# Patient Record
Sex: FEMALE | Race: BLACK OR AFRICAN AMERICAN | Employment: FULL TIME | URBAN - METROPOLITAN AREA
[De-identification: names, ages, dates, MRNs, and addresses within clinical notes are randomized per-mention and may not be internally consistent; named-entity substitution may affect disease eponyms.]

---

## 2017-02-14 LAB
ANTIBODY SCREEN, EXTERNAL: NEGATIVE
ANTIBODY SCREEN, EXTERNAL: POSITIVE
CHLAMYDIA, EXTERNAL: NEGATIVE
HBSAG, EXTERNAL: NEGATIVE
HIV, EXTERNAL: NEGATIVE
RUBELLA, EXTERNAL: NORMAL
TYPE, ABO & RH, EXTERNAL: NORMAL
TYPE, ABO & RH, EXTERNAL: NORMAL

## 2017-06-27 LAB — T. PALLIDUM, EXTERNAL: NEGATIVE

## 2017-08-25 LAB — GRBS, EXTERNAL: POSITIVE

## 2017-09-06 ENCOUNTER — ANESTHESIA (OUTPATIENT)
Dept: LABOR AND DELIVERY | Age: 32
End: 2017-09-06
Payer: COMMERCIAL

## 2017-09-06 ENCOUNTER — HOSPITAL ENCOUNTER (INPATIENT)
Age: 32
LOS: 2 days | Discharge: HOME | End: 2017-09-08
Attending: OBSTETRICS & GYNECOLOGY | Admitting: OBSTETRICS & GYNECOLOGY
Payer: COMMERCIAL

## 2017-09-06 ENCOUNTER — ANESTHESIA EVENT (OUTPATIENT)
Dept: LABOR AND DELIVERY | Age: 32
End: 2017-09-06
Payer: COMMERCIAL

## 2017-09-06 PROBLEM — Z37.9 NORMAL LABOR: Status: ACTIVE | Noted: 2017-09-06

## 2017-09-06 LAB
ALBUMIN SERPL-MCNC: 2.5 G/DL (ref 3.5–5)
ALBUMIN/GLOB SERPL: 0.6 {RATIO} (ref 1.1–2.2)
ALP SERPL-CCNC: 164 U/L (ref 45–117)
ALT SERPL-CCNC: 16 U/L (ref 12–78)
ANION GAP SERPL CALC-SCNC: 9 MMOL/L (ref 5–15)
AST SERPL-CCNC: 19 U/L (ref 15–37)
BILIRUB SERPL-MCNC: 0.3 MG/DL (ref 0.2–1)
BUN SERPL-MCNC: 4 MG/DL (ref 6–20)
BUN/CREAT SERPL: 8 (ref 12–20)
CALCIUM SERPL-MCNC: 8.5 MG/DL (ref 8.5–10.1)
CHLORIDE SERPL-SCNC: 106 MMOL/L (ref 97–108)
CO2 SERPL-SCNC: 22 MMOL/L (ref 21–32)
CREAT SERPL-MCNC: 0.51 MG/DL (ref 0.55–1.02)
ERYTHROCYTE [DISTWIDTH] IN BLOOD BY AUTOMATED COUNT: 12.9 % (ref 11.5–14.5)
GLOBULIN SER CALC-MCNC: 4 G/DL (ref 2–4)
GLUCOSE SERPL-MCNC: 75 MG/DL (ref 65–100)
HCT VFR BLD AUTO: 33 % (ref 35–47)
HGB BLD-MCNC: 10.8 G/DL (ref 11.5–16)
MCH RBC QN AUTO: 28.1 PG (ref 26–34)
MCHC RBC AUTO-ENTMCNC: 32.7 G/DL (ref 30–36.5)
MCV RBC AUTO: 85.9 FL (ref 80–99)
PLATELET # BLD AUTO: 202 K/UL (ref 150–400)
POTASSIUM SERPL-SCNC: 3.6 MMOL/L (ref 3.5–5.1)
PROT SERPL-MCNC: 6.5 G/DL (ref 6.4–8.2)
RBC # BLD AUTO: 3.84 M/UL (ref 3.8–5.2)
SODIUM SERPL-SCNC: 137 MMOL/L (ref 136–145)
URATE SERPL-MCNC: 3.7 MG/DL (ref 2.6–6)
WBC # BLD AUTO: 6.7 K/UL (ref 3.6–11)

## 2017-09-06 PROCEDURE — 65410000002 HC RM PRIVATE OB

## 2017-09-06 PROCEDURE — 0UQMXZZ REPAIR VULVA, EXTERNAL APPROACH: ICD-10-PCS | Performed by: OBSTETRICS & GYNECOLOGY

## 2017-09-06 PROCEDURE — 74011000250 HC RX REV CODE- 250

## 2017-09-06 PROCEDURE — 80053 COMPREHEN METABOLIC PANEL: CPT | Performed by: OBSTETRICS & GYNECOLOGY

## 2017-09-06 PROCEDURE — 84550 ASSAY OF BLOOD/URIC ACID: CPT | Performed by: OBSTETRICS & GYNECOLOGY

## 2017-09-06 PROCEDURE — 10907ZC DRAINAGE OF AMNIOTIC FLUID, THERAPEUTIC FROM PRODUCTS OF CONCEPTION, VIA NATURAL OR ARTIFICIAL OPENING: ICD-10-PCS | Performed by: OBSTETRICS & GYNECOLOGY

## 2017-09-06 PROCEDURE — 75410000000 HC DELIVERY VAGINAL/SINGLE

## 2017-09-06 PROCEDURE — 85027 COMPLETE CBC AUTOMATED: CPT | Performed by: OBSTETRICS & GYNECOLOGY

## 2017-09-06 PROCEDURE — 36415 COLL VENOUS BLD VENIPUNCTURE: CPT | Performed by: OBSTETRICS & GYNECOLOGY

## 2017-09-06 PROCEDURE — 00HU33Z INSERTION OF INFUSION DEVICE INTO SPINAL CANAL, PERCUTANEOUS APPROACH: ICD-10-PCS | Performed by: ANESTHESIOLOGY

## 2017-09-06 PROCEDURE — 77030011943

## 2017-09-06 PROCEDURE — 0HQ9XZZ REPAIR PERINEUM SKIN, EXTERNAL APPROACH: ICD-10-PCS | Performed by: OBSTETRICS & GYNECOLOGY

## 2017-09-06 PROCEDURE — 75410000003 HC RECOV DEL/VAG/CSECN EA 0.5 HR

## 2017-09-06 PROCEDURE — 76060000078 HC EPIDURAL ANESTHESIA

## 2017-09-06 PROCEDURE — 77030007880 HC KT SPN EPDRL BBMI -B

## 2017-09-06 PROCEDURE — 74011250636 HC RX REV CODE- 250/636: Performed by: OBSTETRICS & GYNECOLOGY

## 2017-09-06 PROCEDURE — 74011250636 HC RX REV CODE- 250/636

## 2017-09-06 PROCEDURE — 74011250637 HC RX REV CODE- 250/637: Performed by: OBSTETRICS & GYNECOLOGY

## 2017-09-06 PROCEDURE — 75410000002 HC LABOR FEE PER 1 HR

## 2017-09-06 RX ORDER — OXYCODONE AND ACETAMINOPHEN 5; 325 MG/1; MG/1
2 TABLET ORAL
Status: DISCONTINUED | OUTPATIENT
Start: 2017-09-06 | End: 2017-09-08 | Stop reason: HOSPADM

## 2017-09-06 RX ORDER — DIPHENHYDRAMINE HCL 25 MG
25 CAPSULE ORAL
Status: DISCONTINUED | OUTPATIENT
Start: 2017-09-06 | End: 2017-09-08 | Stop reason: HOSPADM

## 2017-09-06 RX ORDER — BUPIVACAINE HYDROCHLORIDE 5 MG/ML
30 INJECTION, SOLUTION EPIDURAL; INTRACAUDAL ONCE
Status: ACTIVE | OUTPATIENT
Start: 2017-09-06 | End: 2017-09-06

## 2017-09-06 RX ORDER — ONDANSETRON 4 MG/1
4 TABLET, ORALLY DISINTEGRATING ORAL
Status: DISCONTINUED | OUTPATIENT
Start: 2017-09-06 | End: 2017-09-08 | Stop reason: HOSPADM

## 2017-09-06 RX ORDER — OXYTOCIN IN 5 % DEXTROSE 30/500 ML
PLASTIC BAG, INJECTION (ML) INTRAVENOUS
Status: DISPENSED
Start: 2017-09-06 | End: 2017-09-06

## 2017-09-06 RX ORDER — SODIUM CHLORIDE 0.9 % (FLUSH) 0.9 %
5-10 SYRINGE (ML) INJECTION EVERY 8 HOURS
Status: DISCONTINUED | OUTPATIENT
Start: 2017-09-06 | End: 2017-09-06 | Stop reason: HOSPADM

## 2017-09-06 RX ORDER — ACETAMINOPHEN 325 MG/1
650 TABLET ORAL
Status: DISCONTINUED | OUTPATIENT
Start: 2017-09-06 | End: 2017-09-08 | Stop reason: HOSPADM

## 2017-09-06 RX ORDER — NALOXONE HYDROCHLORIDE 0.4 MG/ML
0.4 INJECTION, SOLUTION INTRAMUSCULAR; INTRAVENOUS; SUBCUTANEOUS AS NEEDED
Status: DISCONTINUED | OUTPATIENT
Start: 2017-09-06 | End: 2017-09-06 | Stop reason: HOSPADM

## 2017-09-06 RX ORDER — SIMETHICONE 80 MG
80 TABLET,CHEWABLE ORAL
Status: DISCONTINUED | OUTPATIENT
Start: 2017-09-06 | End: 2017-09-08 | Stop reason: HOSPADM

## 2017-09-06 RX ORDER — BUPIVACAINE HYDROCHLORIDE 5 MG/ML
30 INJECTION, SOLUTION EPIDURAL; INTRACAUDAL AS NEEDED
Status: DISCONTINUED | OUTPATIENT
Start: 2017-09-06 | End: 2017-09-06 | Stop reason: HOSPADM

## 2017-09-06 RX ORDER — SODIUM CHLORIDE 0.9 % (FLUSH) 0.9 %
5-10 SYRINGE (ML) INJECTION AS NEEDED
Status: DISCONTINUED | OUTPATIENT
Start: 2017-09-06 | End: 2017-09-08 | Stop reason: HOSPADM

## 2017-09-06 RX ORDER — NALBUPHINE HYDROCHLORIDE 10 MG/ML
10 INJECTION, SOLUTION INTRAMUSCULAR; INTRAVENOUS; SUBCUTANEOUS
Status: DISCONTINUED | OUTPATIENT
Start: 2017-09-06 | End: 2017-09-06 | Stop reason: HOSPADM

## 2017-09-06 RX ORDER — SODIUM CHLORIDE, SODIUM LACTATE, POTASSIUM CHLORIDE, CALCIUM CHLORIDE 600; 310; 30; 20 MG/100ML; MG/100ML; MG/100ML; MG/100ML
125 INJECTION, SOLUTION INTRAVENOUS CONTINUOUS
Status: DISCONTINUED | OUTPATIENT
Start: 2017-09-06 | End: 2017-09-07

## 2017-09-06 RX ORDER — FENTANYL CITRATE 50 UG/ML
INJECTION, SOLUTION INTRAMUSCULAR; INTRAVENOUS
Status: DISPENSED
Start: 2017-09-06 | End: 2017-09-06

## 2017-09-06 RX ORDER — DOCUSATE SODIUM 100 MG/1
100 CAPSULE, LIQUID FILLED ORAL
Status: DISCONTINUED | OUTPATIENT
Start: 2017-09-06 | End: 2017-09-08 | Stop reason: HOSPADM

## 2017-09-06 RX ORDER — IBUPROFEN 400 MG/1
800 TABLET ORAL EVERY 8 HOURS
Status: DISCONTINUED | OUTPATIENT
Start: 2017-09-06 | End: 2017-09-08 | Stop reason: HOSPADM

## 2017-09-06 RX ORDER — FENTANYL/BUPIVACAINE/NS/PF 2-1250MCG
PREFILLED PUMP RESERVOIR EPIDURAL
Status: COMPLETED
Start: 2017-09-06 | End: 2017-09-06

## 2017-09-06 RX ORDER — FENTANYL CITRATE 50 UG/ML
100 INJECTION, SOLUTION INTRAMUSCULAR; INTRAVENOUS
Status: DISCONTINUED | OUTPATIENT
Start: 2017-09-06 | End: 2017-09-06 | Stop reason: HOSPADM

## 2017-09-06 RX ORDER — TERBUTALINE SULFATE 1 MG/ML
0.25 INJECTION SUBCUTANEOUS AS NEEDED
Status: DISCONTINUED | OUTPATIENT
Start: 2017-09-06 | End: 2017-09-06 | Stop reason: HOSPADM

## 2017-09-06 RX ORDER — FENTANYL/BUPIVACAINE/NS/PF 2-1250MCG
1-16 PREFILLED PUMP RESERVOIR EPIDURAL CONTINUOUS
Status: DISCONTINUED | OUTPATIENT
Start: 2017-09-06 | End: 2017-09-07

## 2017-09-06 RX ORDER — HYDROCORTISONE 1 %
CREAM (GRAM) TOPICAL AS NEEDED
Status: DISCONTINUED | OUTPATIENT
Start: 2017-09-06 | End: 2017-09-08 | Stop reason: HOSPADM

## 2017-09-06 RX ORDER — FENTANYL CITRATE 50 UG/ML
INJECTION, SOLUTION INTRAMUSCULAR; INTRAVENOUS AS NEEDED
Status: DISCONTINUED | OUTPATIENT
Start: 2017-09-06 | End: 2017-09-06 | Stop reason: HOSPADM

## 2017-09-06 RX ORDER — BUPIVACAINE HYDROCHLORIDE 5 MG/ML
INJECTION, SOLUTION EPIDURAL; INTRACAUDAL
Status: DISPENSED
Start: 2017-09-06 | End: 2017-09-06

## 2017-09-06 RX ORDER — OXYTOCIN/RINGER'S LACTATE 20/1000 ML
125-1000 PLASTIC BAG, INJECTION (ML) INTRAVENOUS AS NEEDED
Status: DISCONTINUED | OUTPATIENT
Start: 2017-09-06 | End: 2017-09-08 | Stop reason: HOSPADM

## 2017-09-06 RX ORDER — OXYCODONE AND ACETAMINOPHEN 5; 325 MG/1; MG/1
1 TABLET ORAL
Status: DISCONTINUED | OUTPATIENT
Start: 2017-09-06 | End: 2017-09-08 | Stop reason: HOSPADM

## 2017-09-06 RX ORDER — LIDOCAINE HYDROCHLORIDE AND EPINEPHRINE 15; 5 MG/ML; UG/ML
INJECTION, SOLUTION EPIDURAL AS NEEDED
Status: DISCONTINUED | OUTPATIENT
Start: 2017-09-06 | End: 2017-09-06 | Stop reason: HOSPADM

## 2017-09-06 RX ORDER — FENTANYL CITRATE 50 UG/ML
100 INJECTION, SOLUTION INTRAMUSCULAR; INTRAVENOUS ONCE
Status: DISCONTINUED | OUTPATIENT
Start: 2017-09-06 | End: 2017-09-06 | Stop reason: HOSPADM

## 2017-09-06 RX ORDER — BUPIVACAINE HYDROCHLORIDE 2.5 MG/ML
INJECTION, SOLUTION EPIDURAL; INFILTRATION; INTRACAUDAL AS NEEDED
Status: DISCONTINUED | OUTPATIENT
Start: 2017-09-06 | End: 2017-09-06 | Stop reason: HOSPADM

## 2017-09-06 RX ORDER — AMMONIA 15 % (W/V)
1 AMPUL (EA) INHALATION AS NEEDED
Status: DISCONTINUED | OUTPATIENT
Start: 2017-09-06 | End: 2017-09-08 | Stop reason: HOSPADM

## 2017-09-06 RX ORDER — SODIUM CHLORIDE 0.9 % (FLUSH) 0.9 %
5-10 SYRINGE (ML) INJECTION EVERY 8 HOURS
Status: DISCONTINUED | OUTPATIENT
Start: 2017-09-06 | End: 2017-09-08 | Stop reason: HOSPADM

## 2017-09-06 RX ORDER — HYDROCORTISONE ACETATE PRAMOXINE HCL 2.5; 1 G/100G; G/100G
CREAM TOPICAL AS NEEDED
Status: DISCONTINUED | OUTPATIENT
Start: 2017-09-06 | End: 2017-09-08 | Stop reason: HOSPADM

## 2017-09-06 RX ORDER — SODIUM CHLORIDE 0.9 % (FLUSH) 0.9 %
5-10 SYRINGE (ML) INJECTION AS NEEDED
Status: DISCONTINUED | OUTPATIENT
Start: 2017-09-06 | End: 2017-09-06 | Stop reason: HOSPADM

## 2017-09-06 RX ORDER — CLINDAMYCIN PHOSPHATE 900 MG/50ML
900 INJECTION INTRAVENOUS EVERY 8 HOURS
Status: DISCONTINUED | OUTPATIENT
Start: 2017-09-06 | End: 2017-09-06 | Stop reason: HOSPADM

## 2017-09-06 RX ADMIN — Medication 10 ML: at 09:40

## 2017-09-06 RX ADMIN — LIDOCAINE HYDROCHLORIDE AND EPINEPHRINE 3 ML: 15; 5 INJECTION, SOLUTION EPIDURAL at 11:05

## 2017-09-06 RX ADMIN — IBUPROFEN 800 MG: 400 TABLET, FILM COATED ORAL at 18:23

## 2017-09-06 RX ADMIN — Medication 10 ML/HR: at 11:13

## 2017-09-06 RX ADMIN — FENTANYL CITRATE 100 MCG: 50 INJECTION, SOLUTION INTRAMUSCULAR; INTRAVENOUS at 11:03

## 2017-09-06 RX ADMIN — FENTANYL 0.2 MG/100ML-BUPIV 0.125%-NACL 0.9% EPIDURAL INJ 10 ML/HR: 2/0.125 SOLUTION at 11:13

## 2017-09-06 RX ADMIN — BUPIVACAINE HYDROCHLORIDE 3 ML: 2.5 INJECTION, SOLUTION EPIDURAL; INFILTRATION; INTRACAUDAL at 11:06

## 2017-09-06 RX ADMIN — LIDOCAINE HYDROCHLORIDE AND EPINEPHRINE 2 ML: 15; 5 INJECTION, SOLUTION EPIDURAL at 11:03

## 2017-09-06 RX ADMIN — BUPIVACAINE HYDROCHLORIDE 2 ML: 2.5 INJECTION, SOLUTION EPIDURAL; INFILTRATION; INTRACAUDAL at 11:07

## 2017-09-06 RX ADMIN — CLINDAMYCIN PHOSPHATE 900 MG: 18 INJECTION, SOLUTION INTRAMUSCULAR; INTRAVENOUS at 09:05

## 2017-09-06 RX ADMIN — SODIUM CHLORIDE, SODIUM LACTATE, POTASSIUM CHLORIDE, AND CALCIUM CHLORIDE 125 ML/HR: 600; 310; 30; 20 INJECTION, SOLUTION INTRAVENOUS at 08:50

## 2017-09-06 NOTE — PROGRESS NOTES
0745 Bedside report received from Kayden Walsh RN. Pt in bed, breathing well with UC's.   0937 Pt off monitor to ambulate in hallway, instructions given to return if SROM, contractions intensify. 1030 Dr Ruben Thompson at bedside, SVE 6-7/80/-1. Plan for epidural.   1057 Dr Felipe Curiel at bedside to place epidural.   [address] REPORT:    Verbal report given to Isrrael Mejia RN on [facility]  being transferred to MIU(unit) for routine progression of care       Report consisted of patients Situation, Background, Assessment and   Recommendations(SBAR). Information from the following report(s) SBAR, Intake/Output and MAR was reviewed with the receiving nurse. Lines:   Peripheral IV 09/06/17 Left Forearm (Active)   Site Assessment Clean, dry, & intact 9/6/2017  9:06 AM   Phlebitis Assessment 0 9/6/2017  9:06 AM   Infiltration Assessment 0 9/6/2017  9:06 AM   Dressing Status Clean, dry, & intact 9/6/2017  9:06 AM   Dressing Type Tape;Transparent 9/6/2017  9:06 AM   Hub Color/Line Status Pink; Infusing 9/6/2017  9:06 AM        Opportunity for questions and clarification was provided.       Patient transported with:   Registered Nurse

## 2017-09-06 NOTE — ROUTINE PROCESS
TRANSFER - IN REPORT:    Verbal report received from Emily Ospina RN(name) on Luis Salinas  being received from L&D(unit) for routine progression of care      Report consisted of patients Situation, Background, Assessment and   Recommendations(SBAR). Information from the following report(s) SBAR was reviewed with the receiving nurse. Opportunity for questions and clarification was provided. Assessment completed upon patients arrival to unit and care assumed.

## 2017-09-06 NOTE — H&P
EDC:2017  EGA: 37 weeks, 4 days      Primary Provider:  Lyndall Brunner MD    CC:  Labor. History of Present Illness:  Pt presents with regular CTX q3-7 mins that are painful. Pt reports +FM, no VB, LOF. Reports pregnancy has been uncomplicated. She is GBS+. Patient's Prenatal Care with Doctor of Record Lyndall Brunner MD Notable For -    GBS positive RX in labor  Obesity in pregnancy BMI>34.99-FS ____  Size greater than dates;;efw 83rd%  *Note: varicella non immune  Headache pregnant  Normal pregnancy multigravida  Obesity in pregnancy BMI>34.99-FS _(hgbA1C 5.7%, 2017), baby ASA, mo growth u/s_          Impression & Recommendations:    Problem # 1:  Labor term active (ICD-650) (DKF51-M78)  Admit  EFM/[place]  Clear liquids. AROM PRN  Epidural PRN. Anticipate . Problem # 2:  Gestational hypertension (ICD-642.33) (COV16-C33.3)  Mild range BPs on admission  Will get pre-eclampsia labs. No indication for Mg at this time. Problem # 3:  GBS positive RX in labor (LIY-368.00) (WPI10-L45.82)  Clinda for GBS ppx. Will AROM if adequate coverage. Problem # 4:  Size greater than dates;;efw 83rd% (JCD-636.43) (OUS66-L10.62x0)    Problem # 5:  *Note: varicella non immune (ICD-V72.31) (XBY95-Y82.419)      Past Pregnancy History      :  2     Term Births:  1     Premature Births: 0     Living Children: 1     Para:   1     Mult. Births:  0     Prev : 0     Prev.  attempt? 0     Aborta:  0     Elect. Ab:  0     Spont.  Ab:  0     Ectopics:  0    Pregnancy # 1     Delivery date:   10/21/2009     Weeks Gestation: 38+      labor:   no     Delivery type:        Hours of labor:   7     Anesthesia type:   epidural     Delivery location:   [facility]     Infant Sex:  Male     Birth weight:  5lb 13oz     Name:  Uli     Comments:  Del by Dr. Francisco Javier White at term, labile BPs,     Pregnancy # 2     Comments:  present        Past Medical History:     Reviewed history from 2016 and no changes required: Allergies        Headaches (Migraines) and Sinus headaches        GBS POS        PIH near term with G1        IUD Mirena insertion 3/10        Ovarian Cysts    Past Surgical History:     Reviewed history from 2010 and no changes required:        L Ankle-surgery         [place] [facility]-age 21         m [id number] [address]    Family History Summary:      Reviewed history Last on 2017 and no changes required:2017      General Comments - FH:  Family history transferred to [place] [address] History:     Reviewed history from 2017 and no changes required:        , stable relationship        Uli 10/09      Previous Tobacco Use: Signed On - 2017  Smoked Tobacco Use:  Never smoker  Smokeless Tobacco Use:  Never     Counseled to quit/cut down:  yes  Passive smoke exposure:  yes  Drug use:  no  HIV high-risk behavior:  No  Caffeine use:  <1 drinks per day    Previous Alcohol Use: Signed On - 2017  Alcohol use:  yes     Type:  occas  Exercise:  no  Seatbelt use:  100 %  Sun Exposure:  Ocassionaly    Mammogram History:     Date of Last Mammogram:  2014    PAP Smear History:     Date of Last PAP Smear:  2016      Review of Systems        See HPI    Except as noted in the HPI, the review of systems is negative for General, Breast, , Resp, GI, Endo, MS, Psych and Heme.     Allergies    PCN (Moderate)  ERYTHROMYCIN (Moderate)      Medications Removed from Medication List        Flowsheet View for Follow-up Visit     Estimated weeks of        gestation:  37 4/7     Fetal position:  vertex     Cx Dilation:  3cm     Cx Effacement: 90%     Cx Station:  -2          Physical Exam     General           General appearance:  no acute distress    Head           Inspection:   normal    Eyes           External:   EOM intact    ENT           Dental:   adequate dentition    Chest           Lungs:  clear to auscultation Heart:  regular rate and rhythm    Extremeties           Extremeties:  0 edema    Neurological           Reflexes:  2+ and symmetric with no pathological reflexes    Psych           Orientation:  oriented to time, place, and person          Mood:  no appearance of anxiety, depression, or agitation    Lymph           Inguinal:  no inguinal adenopathy    Skin           Inspection:  no rashes, suspicious lesions, or ulcerations    Abdomen           Abdomen:  gravid    Pelvic Exam           EGBUS:  no lesions          Vagina:  normal appearing without lesions or discharge          Uterus:  gravid          Cervix:  no lesions or discharge                  Dilation: : 3cm                  Effacement:  90%                  Station:  -2                  Presentation:  vertex                  Membranes:  intact            Impression & Recommendations:    Problem # 1:  Labor term active (ICD-650) (HHY25-U87)  Admit  EFM/[place]  Clear liquids. AROM PRN  Epidural PRN. Anticipate . Problem # 2:  Gestational hypertension (ICD-642.33) (OIU47-Q24.3)  Mild range BPs on admission  Will get pre-eclampsia labs. No indication for Mg at this time. Problem # 3:  GBS positive RX in labor (ZY-902.87) (OKF88-W20.82)  Clinda for GBS ppx. Will AROM if adequate coverage.       Problem # 4:  Size greater than dates;;efw 83rd% ([facility]-092.13) (PKA61-L05.62x0)    Problem # 5:  *Note: varicella non immune (ICD-V72.31) (RAA36-Q77.419)      Medications (at conclusion of this visit)    2017 * IRON   2017 TYLENOL TABS (ACETAMINOPHEN TABS)   2017 * PNV           LABORATORY DATA   TEST DATE RESULT   Group B Strep culture 2017 Positive                                   (Group B Strep Culture Result Field)   Blood Type 2017 O                                             (Blood Type Result Field)   Rh 2017 Positive                                   (Rh Result Field)   Rhogam Inj Given 10/21/2009 *   Tdap Vaccine Given 06/27/2017 Vacc. [address]   Antibody Screen 06/27/2017 Negative   Rubella  [facility] Reference Ranges On or After 3/10/14                  <0.90              Non-immune      0.90 - 0.99     Equivocal      >0.99              Immune    [facility] Reference Ranges  Before 3/10/14           <5                 Non-immune             5 - 9               Equivocal            >9                 Immune  [facility] Reference Ranges       < Or = 0.90       Negative             0.91-1.09          Equivocal            > Or = 1.10       Positive   02/14/2017     5.17     TPA (T Pallidum Antibodies) 06/27/2017 Negative   Serology (RPR) 10/21/2009 *   HBsAg 02/14/2017 Negative   HIV 02/14/2017 Non Reactive   Hemoglobin 06/27/2017 11.1   Hematocrit 06/27/2017 33.5   Platelets 28/85/5771 279 X10E3/UL   TSH 02/14/2017 1.190   Urine Culture 02/17/2017 Negative   GC DNA Probe 02/14/2017 Negative   Chlamydia DNA 02/14/2017 Negative   PAP 12/09/2016 NIL   Flu Vaccine Given 02/14/2017 vacc. elsewhere   HGBA1C 02/14/2017 5.7   HGB Electro 02/21/2017 AA   T4, Free 10/21/2009 *   BG Fasting 10/21/2009 *   GTT 1H 50G 06/27/2017 90   GTT 1H 100G 10/21/2009 *   GTT 2H 100G 10/21/2009 *   GTT 3H 100G 10/21/2009 *   Glucose Plasma 10/21/2009 *   CF Accept or Decline 02/14/2017 declined   CF Screen Result     Nuchal Trans 05/17/2017 3.25^3. 25 mm&millimeters   AFP Only 04/11/2017 *Screen Negative*   Tetra 05/21/2009 AFPNEG   AFP Serum 10/21/2009 *   CVS     AFP Amniotic 10/21/2009 *   Amnio Karyo 10/21/2009 *   FISH 10/21/2009 *   GC Culture 10/21/2009 *   Chlamydia Cult 10/21/2009 *   Ureaplasma     Mycoplasma     WBC 04/11/2017 8.3 X10E3/UL   RBC 04/11/2017 3.91 X10E6/UL   MCV 04/11/2017 90   MCH 04/11/2017 30.2   MCHC RBC 04/11/2017 33.7     ULTRASOUND DATA   TEST DATE RESULT   Estimated Fetal Weight 08/10/2017 4684.05440331^2199 g&grams                                     Weight % 08/10/2017 79^79% %&percent AMOL 08/10/2017 13.45^13. 5 cm&centimeters                    BPP 08/10/2017 8^8 [n/a]&Not applicable   Cervical Length (mm) 12/19/2014 35.000           Electronically signed by Kaiden Birch MD on 09/06/2017 at 8:12 AM    ________________________________________________________________________

## 2017-09-06 NOTE — ANESTHESIA PREPROCEDURE EVALUATION
Anesthetic History   No history of anesthetic complications            Review of Systems / Medical History  Patient summary reviewed, nursing notes reviewed and pertinent labs reviewed    Pulmonary  Within defined limits                 Neuro/Psych   Within defined limits           Cardiovascular  Within defined limits                     GI/Hepatic/Renal  Within defined limits              Endo/Other        Morbid obesity     Other Findings              Physical Exam    Airway  Mallampati: II  TM Distance: > 6 cm  Neck ROM: normal range of motion   Mouth opening: Normal     Cardiovascular  Regular rate and rhythm,  S1 and S2 normal,  no murmur, click, rub, or gallop             Dental  No notable dental hx       Pulmonary  Breath sounds clear to auscultation               Abdominal  GI exam deferred       Other Findings            Anesthetic Plan    ASA: 3  Anesthesia type: epidural          Induction: Intravenous  Anesthetic plan and risks discussed with: Patient

## 2017-09-06 NOTE — IP AVS SNAPSHOT
[address] 
[phone] Patient: Aguilar Mayberry MRN: [id number] :1985 You are allergic to the following Allergen Reactions Erythromycin Nausea and Vomiting Pcn (Penicillins) Hives Recent Documentation Height Weight Breastfeeding? BMI OB Status Smoking Status 1.651 m 109.8 kg Unknown 40.27 kg/m2 Recent pregnancy Never Smoker Unresulted Labs Order Current Status SAMPLE TO BLOOD BANK In process Emergency Contacts Name Discharge Info Relation Home Work Mobile Jovani Del Toro DISCHARGE CAREGIVER [3] Spouse [3] [phone] Claudeen Marvel  Parent [1] [phone] About your hospitalization You were admitted on:  2017 You last received care in the:  [address] [facility] You were discharged on:  2017 Unit phone number:  [phone] Why you were hospitalized Your primary diagnosis was:  Not on File Your diagnoses also included:  Normal Labor Providers Seen During Your Hospitalizations Provider Role Specialty Primary office phone Neo Mena MD Attending Provider Obstetrics & Gynecology [phone] Your Primary Care Physician (PCP) Primary Care Physician Office Phone Office Fax [address] [address] [phone] Follow-up Information Follow up With Details Comments Contact Info [facility] Call As needed with breastfeeding questions [address] [place] [address], [address] 
[phone] Kiel Tavares MD   [address] [place] [address] [address] [facility] [place] [address] 
[phone] Neo Mena MD Schedule an appointment as soon as possible for a visit in 6 weeks  [address]  
[address] [place] [facility] 
[phone] Current Discharge Medication List  
  
 START taking these medications Dose & Instructions Dispensing Information Comments Morning Noon Evening Bedtime  
 ibuprofen 800 mg tablet Commonly known as:  MOTRIN Your last dose was: Your next dose is:    
   
   
 Dose:  800 mg Take 1 Tab by mouth every eight (8) hours as needed for Pain. Quantity:  30 Tab Refills:  0 CONTINUE these medications which have NOT CHANGED Dose & Instructions Dispensing Information Comments Morning Noon Evening Bedtime PNV66-Iron Fumarate-FA-DSS-DHA 26-1.2- mg Cap Your last dose was: Your next dose is: Take  by mouth. Indications: Pregnancy Refills:  0 STOP taking these medications FLONASE 50 mcg/actuation nasal spray Generic drug:  fluticasone Intrauterine Device (IUD) Iud Where to Get Your Medications Information on where to get these meds will be given to you by the nurse or doctor. ! Ask your nurse or doctor about these medications  
  ibuprofen 800 mg tablet Discharge Instructions After Your Delivery (the Postpartum Period): Care Instructions Your Care Instructions Congratulations on the birth of your baby. Like pregnancy, the  period can be a time of excitement, ashkan, and exhaustion. You may look at your wondrous little baby and feel happy. You may also be overwhelmed by your new sleep hours and new responsibilities. At first, babies often sleep during the days and are awake at night. They do not have a pattern or routine. They may make sudden gasps, jerk themselves awake, or look like they have crossed eyes. These are all normal, and they may even make you smile. In these first weeks after delivery, try to take good care of yourself.  It may take 4 to 6 weeks to feel like yourself again, and possibly longer if you had a  birth. You will likely feel very tired for several weeks. Your days will be full of ups and downs, but lots of ashkan as well. Follow-up care is a key part of your treatment and safety. Be sure to make and go to all appointments, and call your doctor if you are having problems. It's also a good idea to know your test results and keep a list of the medicines you take. How can you care for yourself at home? Take care of your body after delivery · Use pads instead of tampons for the bloody flow that may last as long as 2 weeks. · Ease cramps with ibuprofen (Advil, Motrin). · Ease soreness of hemorrhoids and the area between your vagina and rectum with ice compresses or witch hazel pads. · Ease constipation by drinking lots of fluid and eating high-fiber foods. Ask your doctor about over-the-counter stool softeners. · Cleanse yourself with a gentle squeeze of warm water from a bottle instead of wiping with toilet paper. · Take a sitz bath in warm water several times a day. · Wear a good nursing bra. Ease sore and swollen breasts with warm, wet washcloths. · If you are not breastfeeding, use ice rather than heat for breast soreness. · Your period may not start for several months if you are breastfeeding. You may bleed more, and longer at first, than you did before you got pregnant. · Wait until you are healed (about 4 to 6 weeks) before you have sexual intercourse. Your doctor will tell you when it is okay to have sex. · Try not to travel with your baby for 5 or 6 weeks. If you take a long car trip, make frequent stops to walk around and stretch. Avoid exhaustion · Rest every day. Try to nap when your baby naps. · Ask another adult to be with you for a few days after delivery. · Plan for  if you have other children. · Stay flexible so you can eat at odd hours and sleep when you need to. Both you and your baby are making new schedules. · Plan small trips to get out of the house. Change can make you feel less tired. · Ask for help with housework, cooking, and shopping. Remind yourself that your job is to care for your baby. Know about help for postpartum depression · \"Baby blues\" are common for the first 1 to 2 weeks after birth. You may cry or feel sad or irritable for no reason. · Rest whenever you can. Being tired makes it harder to handle your emotions. · Go for walks with your baby. · Talk to your partner, friends, and family about your feelings. · If your symptoms last for more than a few weeks, or if you feel very depressed, ask your doctor for help. · Postpartum depression can be treated. Support groups and counseling can help. Sometimes medicine can also help. Stay healthy · Eat healthy foods so you have more energy, make good breast milk, and lose extra baby pounds. · If you breastfeed, avoid alcohol and drugs. Stay smoke-free. If you quit during pregnancy, congratulations. · Start daily exercise after 4 to 6 weeks, but rest when you feel tired. · Learn exercises to tone your belly. Do Kegel exercises to regain strength in your pelvic muscles. You can do these exercises while you stand or sit. ¨ Squeeze the same muscles you would use to stop your urine. Your belly and thighs should not move. ¨ Hold the squeeze for 3 seconds, and then relax for 3 seconds. ¨ Start with 3 seconds. Then add 1 second each week until you are able to squeeze for 10 seconds. ¨ Repeat the exercise 10 to 15 times for each session. Do three or more sessions each day. · Find a class for new mothers and new babies that has an exercise time. · If you had a  birth, give yourself a bit more time before you exercise, and be careful. When should you call for help? Call 911 anytime you think you may need emergency care. For example, call if: 
· You passed out (lost consciousness). Call your doctor now or seek immediate medical care if: · You have severe vaginal bleeding. This means you are passing blood clots and soaking through a pad each hour for 2 or more hours. · You are dizzy or lightheaded, or you feel like you may faint. · You have a fever. · You have new belly pain, or your pain gets worse. Watch closely for changes in your health, and be sure to contact your doctor if: 
· Your vaginal bleeding seems to be getting heavier. · You have new or worse vaginal discharge. · You feel sad, anxious, or hopeless for more than a few days. · You do not get better as expected. Where can you learn more? Go to http://javi-melvin.info/. Enter A461 in the search box to learn more about \"After Your Delivery (the Postpartum Period): Care Instructions. \" Current as of: March 16, 2017 Content Version: 11.3 © 3622-3154 [employer]. Care instructions adapted under license by [employer] (which disclaims liability or warranty for this information). If you have questions about a medical condition or this instruction, always ask your healthcare professional. [address] any warranty or liability for your use of this information. Discharge Orders None Lively Announcement We are excited to announce that we are making your provider's discharge notes available to you in Lively. You will see these notes when they are completed and signed by the physician that discharged you from your recent hospital stay. If you have any questions or concerns about any information you see in Lively, please call the Health Information Department where you were seen or reach out to your Primary Care Provider for more information about your plan of care. Introducing [facility]! Franko Daly introduces Lively patient portal. Now you can access parts of your medical record, email your doctor's office, and request medication refills online.    
 
1. In your internet browser, go to [email or web address] 2. Click on the First Time User? Click Here link in the Sign In box. You will see the New Member Sign Up page. 3. Enter your Stickybits Access Code exactly as it appears below. You will not need to use this code after youve completed the sign-up process. If you do not sign up before the expiration date, you must request a new code. · Stickybits Access Code: [facility] Expires: 12/7/2017  1:48 PM 
 
4. Enter the last four digits of your Social Security Number (xxxx) and Date of Birth (mm/dd/yyyy) as indicated and click Submit. You will be taken to the next sign-up page. 5. Create a Stickybits ID. This will be your Stickybits login ID and cannot be changed, so think of one that is secure and easy to remember. 6. Create a Stickybits password. You can change your password at any time. 7. Enter your Password Reset Question and Answer. This can be used at a later time if you forget your password. 8. Enter your e-mail address. You will receive e-mail notification when new information is available in [address] 9. Click Sign Up. You can now view and download portions of your medical record. 10. Click the Download Summary menu link to download a portable copy of your medical information. If you have questions, please visit the Frequently Asked Questions section of the Stickybits website. Remember, Stickybits is NOT to be used for urgent needs. For medical emergencies, dial 911. Now available from your iPhone and Android! General Information Please provide this summary of care documentation to your next provider. Patient Signature:  ____________________________________________________________ Date:  ____________________________________________________________  
  
[facility] Provider Signature:  ____________________________________________________________ Date:  ____________________________________________________________

## 2017-09-06 NOTE — IP AVS SNAPSHOT
[address] 
[phone] Patient: Mik Kurtz MRN: [id number] :1985 Current Discharge Medication List  
  
START taking these medications Dose & Instructions Dispensing Information Comments Morning Noon Evening Bedtime  
 ibuprofen 800 mg tablet Commonly known as:  MOTRIN Your last dose was: Your next dose is:    
   
   
 Dose:  800 mg Take 1 Tab by mouth every eight (8) hours as needed for Pain. Quantity:  30 Tab Refills:  0 CONTINUE these medications which have NOT CHANGED Dose & Instructions Dispensing Information Comments Morning Noon Evening Bedtime PNV66-Iron Fumarate-FA-DSS-DHA 26-1.2- mg Cap Your last dose was: Your next dose is: Take  by mouth. Indications: Pregnancy Refills:  0 STOP taking these medications FLONASE 50 mcg/actuation nasal spray Generic drug:  fluticasone Intrauterine Device (IUD) Iud Where to Get Your Medications Information on where to get these meds will be given to you by the nurse or doctor. ! Ask your nurse or doctor about these medications  
  ibuprofen 800 mg tablet

## 2017-09-06 NOTE — PROGRESS NOTES
6702: Pt arrived to LDR 8 c/o contractions every 3-7 minutes. Pt states +FM, denies vaginal bleeding and leaking of fluid.

## 2017-09-06 NOTE — PROGRESS NOTES
Delivery Summary  Patient: Lis Che             Circumcision:   desires  Additional Delivery Comments - , GHTN no meds noted while in labor, nuchal cord x1, GBS+ clinda.     Information for the patient's :  Joan Lacey [[id number]]     Delivery Type: Vaginal, Spontaneous Delivery   Delivery Date: 2017   Delivery Time: 12:35 PM     Birth Weight:       Sex:  male  Delivery Clinician:  Shahana Hair   Gestational Age: Gestational Age: 37w1d    Presentation: Vertex   Position:             Apgars were   and       Resuscitation Method:       Meconium Stained:    Living Status:         Placenta Date/Time:     Placenta Removal:     Placenta Appearance: Vertex [1]     Cord Information:      Cord Events:         Cord Blood Sent?:       Blood Gases Sent?:          Cord pH:  none    Episiotomy:   None  Laceration(s):     L periurethral, 1st degree perineal, hemostatic  Estimated Blood Loss (ml): 300mL    Labor Events  Method: None      Augmentation: None   Cervical Ripening:       None        Operative Vaginal Delivery - none

## 2017-09-06 NOTE — LACTATION NOTE
This note was copied from a baby's chart. Initial Lactation Consultation: Infant born this afternoon vaginally to a  mom at 40 2/7 weeks gestation. Mom has an 6year old that she tried to nurse but had limited success due to latch issues. Infant observed at breast; Baby nursing well today,  deep latch obtained, mother is comfortable, baby feeding vigorously with rhythmic suck, swallow, breathe pattern, occasional audible swallowing, and evident milk transfer, both breasts offered, baby is asleep following feeding.  behaviors reviewed, Very sleepy in first 24 hours, mother must wake baby for feedings, offer hand expressed drops, baby usually will respond and feed vigorously 6-8 times in the first day, but is important to offer 8-12 times,  After baby wakes from deep sleep usually on the 2nd or 3rd day a new behavior pattern follows. Frequent feeding during this brief behavioral phase preceeding milk transition is called cluster feeding. Typical  behavior: baby becomes vigorous at the breast and wants to feed frequently- every 1-2 hours for several feedings. This is the normal process by which the baby demands his/her supply. This type of frequent feeding is the stimulation which causes lactogenesis II (milk coming in). Skin to skin and rooming in  discussed with mom. The benefits include infants temperature regulation, decrease stress in mom and baby, increase in maternal milk production and allowing mom to see early feeding cues. Feeding Plan: Mother will keep baby skin to skin as often as possible, feed on demand, 8-12x/day , respond to feeding cues, obtain latch, listen for audible swallowing, be aware of signs of oxytocin release/ cramping,thirst,sleepiness while breastfeeding, offer both breasts,and will not limit feedings. Mom will use breast massage as she nurses to facilitate lactogenesis.

## 2017-09-07 PROCEDURE — 74011250637 HC RX REV CODE- 250/637: Performed by: OBSTETRICS & GYNECOLOGY

## 2017-09-07 PROCEDURE — 65410000002 HC RM PRIVATE OB

## 2017-09-07 RX ADMIN — IBUPROFEN 800 MG: 400 TABLET, FILM COATED ORAL at 04:37

## 2017-09-07 RX ADMIN — IBUPROFEN 800 MG: 400 TABLET, FILM COATED ORAL at 16:44

## 2017-09-07 NOTE — PROGRESS NOTES
Bedside and Verbal shift change report given to [address] [place], [place] [address]way 951 (oncoming nurse) by TRAFFIQ RN (offgoing nurse). Report included the following information SBAR, Kardex and MAR.

## 2017-09-07 NOTE — LACTATION NOTE
This note was copied from a baby's chart. I did not see the baby at the breast. Mom states baby nursing well today,  deep latch obtained, mother is comfortable, baby feeding vigorously with rhythmic suck, swallow, breathe pattern, audible swallowing, and evident milk transfer, both breasts offered, baby is asleep following feeding. Mom encouraged to watch for feeding cues and feed when baby is showing signs.

## 2017-09-07 NOTE — PROGRESS NOTES
Post-Partum Day Number 1 Progress Note    Benton Mccarty     Assessment:   Hospital Problems  Date Reviewed: 2014          Codes Class Noted POA    Normal labor ICD-10-CM: O80, Z37.9  ICD-9-CM: 686  2017 Unknown            Doing well, post partum day 1    Plan:  1. Continue routine postpartum and perineal care as well as maternal education. 2. The risks and benefits of the circumcision  procedure and anesthesia including: bleeding, infection, variability of cosmetic results were discussed at length with the mother. She is aware that future repeat procedures may be necessary. She gives informed consent to proceed as noted and her questions are answered. Information for the patient's :  Bro Roman [[id number]]   Vaginal, Spontaneous Delivery   Patient doing well without significant complaint. Voiding without difficulty, normal lochia. Current Facility-Administered Medications   Medication Dose Route Frequency    lactated Ringers infusion  125 mL/hr IntraVENous CONTINUOUS    fentaNYL 2mcg/mL - bupivacaine 0.125% pf epidural  1-16 mL/hr Epidural CONTINUOUS    sodium chloride (NS) flush 5-10 mL  5-10 mL IntraVENous Q8H    ibuprofen (MOTRIN) tablet 800 mg  800 mg Oral Q8H       Vitals:  Visit Vitals    /75 (BP 1 Location: Right arm, BP Patient Position: At rest)    Pulse 83    Temp 98.2 °F (36.8 °C)    Resp 14    Ht 5' 5\" (1.651 m)    Wt 109.8 kg (242 lb)    SpO2 99%    Breastfeeding No    BMI 40.27 kg/m2     Temp (24hrs), Av.6 °F (37 °C), Min:98 °F (36.7 °C), Max:99.3 °F (37.4 °C)        Exam:   Patient without distress. Abdomen soft, fundus firm, nontender                Perineum with normal lochia noted. Lower extremities are negative for swelling, cords or tenderness.     Labs:     Lab Results   Component Value Date/Time    WBC 6.7 2017 08:59 AM    WBC 7.3 10/21/2009 10:00 AM    HGB 10.8 2017 08:59 AM    HGB 12.6 10/21/2009 10:00 AM    HCT 33.0 09/06/2017 08:59 AM    HCT 34.7 10/21/2009 10:00 AM    PLATELET 485 62/34/1147 08:59 AM    PLATELET 423 13/77/6344 10:00 AM       No results found for this or any previous visit (from the past 24 hour(s)).

## 2017-09-07 NOTE — ROUTINE PROCESS
Bedside and Verbal shift change report given to Zora Gillespie RN (oncoming nurse) by Caden Quijano RN (offgoing nurse). Report included the following information SBAR.

## 2017-09-08 VITALS
SYSTOLIC BLOOD PRESSURE: 122 MMHG | HEIGHT: 65 IN | DIASTOLIC BLOOD PRESSURE: 78 MMHG | RESPIRATION RATE: 16 BRPM | HEART RATE: 70 BPM | BODY MASS INDEX: 40.32 KG/M2 | WEIGHT: 242 LBS | OXYGEN SATURATION: 99 % | TEMPERATURE: 98.3 F

## 2017-09-08 PROCEDURE — 74011250637 HC RX REV CODE- 250/637: Performed by: OBSTETRICS & GYNECOLOGY

## 2017-09-08 RX ORDER — IBUPROFEN 800 MG/1
800 TABLET ORAL
Qty: 30 TAB | Refills: 0 | Status: SHIPPED | OUTPATIENT
Start: 2017-09-08 | End: 2019-04-26 | Stop reason: ALTCHOICE

## 2017-09-08 RX ADMIN — IBUPROFEN 800 MG: 400 TABLET, FILM COATED ORAL at 00:11

## 2017-09-08 RX ADMIN — IBUPROFEN 800 MG: 400 TABLET, FILM COATED ORAL at 10:10

## 2017-09-08 NOTE — DISCHARGE SUMMARY
Obstetrical Discharge Summary     Name: Mary Perera MRN: [id number]  SSN: [id number]    YOB: 1985  Age: 32 y.o. Sex: female      Admit Date: 2017    Discharge Date: 2017     Admitting Physician: Alberta Hyde MD     Attending Physician:  Randee Bird MD     Admission Diagnoses: Normal labor    Discharge Diagnoses:   Information for the patient's :  Shirlene Carmen [[id number]]   Delivery of a 3.215 kg male infant via Vaginal, Spontaneous Delivery on 2017 at 12:35 PM  by . Apgars were 9 and 9. Additional Diagnoses:   Hospital Problems  Date Reviewed: 2014          Codes Class Noted POA    Normal labor ICD-10-CM: O80, Z37.9  ICD-9-CM: 107  2017 Unknown             Lab Results   Component Value Date/Time    Rubella, External immune 2017    GrBStrep, External positive 2017       Hospital Course: Normal hospital course following the delivery. Disposition at Discharge: Home or self care    Discharged Condition: Stable    Patient Instructions:   Current Discharge Medication List      START taking these medications    Details   ibuprofen (MOTRIN) 800 mg tablet Take 1 Tab by mouth every eight (8) hours as needed for Pain. Qty: 30 Tab, Refills: 0         CONTINUE these medications which have NOT CHANGED    Details   PNV66-Iron Fumarate-FA-DSS-DHA 26-1.2- mg cap Take  by mouth. Indications: Pregnancy         STOP taking these medications       Intrauterine Device, IUD, IUD Comments:   Reason for Stopping:         fluticasone (FLONASE) 50 mcg/actuation nasal spray Comments:   Reason for Stopping:               Reference my discharge instructions.     Follow-up Appointments   Procedures    FOLLOW UP VISIT Appointment in: [address]     Standing Status:   Standing     Number of Occurrences:   1     Order Specific Question:   Appointment in     Answer:   6 Weeks        Signed By:  Ron Herzog MD      2017

## 2017-09-08 NOTE — DISCHARGE INSTRUCTIONS
After Your Delivery (the Postpartum Period): Care Instructions  Your Care Instructions  Congratulations on the birth of your baby. Like pregnancy, the  period can be a time of excitement, ashkan, and exhaustion. You may look at your wondrous little baby and feel happy. You may also be overwhelmed by your new sleep hours and new responsibilities. At first, babies often sleep during the days and are awake at night. They do not have a pattern or routine. They may make sudden gasps, jerk themselves awake, or look like they have crossed eyes. These are all normal, and they may even make you smile. In these first weeks after delivery, try to take good care of yourself. It may take 4 to 6 weeks to feel like yourself again, and possibly longer if you had a  birth. You will likely feel very tired for several weeks. Your days will be full of ups and downs, but lots of ashkan as well. Follow-up care is a key part of your treatment and safety. Be sure to make and go to all appointments, and call your doctor if you are having problems. It's also a good idea to know your test results and keep a list of the medicines you take. How can you care for yourself at home? Take care of your body after delivery  · Use pads instead of tampons for the bloody flow that may last as long as 2 weeks. · Ease cramps with ibuprofen (Advil, Motrin). · Ease soreness of hemorrhoids and the area between your vagina and rectum with ice compresses or witch hazel pads. · Ease constipation by drinking lots of fluid and eating high-fiber foods. Ask your doctor about over-the-counter stool softeners. · Cleanse yourself with a gentle squeeze of warm water from a bottle instead of wiping with toilet paper. · Take a sitz bath in warm water several times a day. · Wear a good nursing bra. Ease sore and swollen breasts with warm, wet washcloths. · If you are not breastfeeding, use ice rather than heat for breast soreness.   · Your period may not start for several months if you are breastfeeding. You may bleed more, and longer at first, than you did before you got pregnant. · Wait until you are healed (about 4 to 6 weeks) before you have sexual intercourse. Your doctor will tell you when it is okay to have sex. · Try not to travel with your baby for 5 or 6 weeks. If you take a long car trip, make frequent stops to walk around and stretch. Avoid exhaustion  · Rest every day. Try to nap when your baby naps. · Ask another adult to be with you for a few days after delivery. · Plan for  if you have other children. · Stay flexible so you can eat at odd hours and sleep when you need to. Both you and your baby are making new schedules. · Plan small trips to get out of the house. Change can make you feel less tired. · Ask for help with housework, cooking, and shopping. Remind yourself that your job is to care for your baby. Know about help for postpartum depression  · \"Baby blues\" are common for the first 1 to 2 weeks after birth. You may cry or feel sad or irritable for no reason. · Rest whenever you can. Being tired makes it harder to handle your emotions. · Go for walks with your baby. · Talk to your partner, friends, and family about your feelings. · If your symptoms last for more than a few weeks, or if you feel very depressed, ask your doctor for help. · Postpartum depression can be treated. Support groups and counseling can help. Sometimes medicine can also help. Stay healthy  · Eat healthy foods so you have more energy, make good breast milk, and lose extra baby pounds. · If you breastfeed, avoid alcohol and drugs. Stay smoke-free. If you quit during pregnancy, congratulations. · Start daily exercise after 4 to 6 weeks, but rest when you feel tired. · Learn exercises to tone your belly. Do Kegel exercises to regain strength in your pelvic muscles. You can do these exercises while you stand or sit.   ¨ Squeeze the same muscles you would use to stop your urine. Your belly and thighs should not move. ¨ Hold the squeeze for 3 seconds, and then relax for 3 seconds. ¨ Start with 3 seconds. Then add 1 second each week until you are able to squeeze for 10 seconds. ¨ Repeat the exercise 10 to 15 times for each session. Do three or more sessions each day. · Find a class for new mothers and new babies that has an exercise time. · If you had a  birth, give yourself a bit more time before you exercise, and be careful. When should you call for help? Call 911 anytime you think you may need emergency care. For example, call if:  · You passed out (lost consciousness). Call your doctor now or seek immediate medical care if:  · You have severe vaginal bleeding. This means you are passing blood clots and soaking through a pad each hour for 2 or more hours. · You are dizzy or lightheaded, or you feel like you may faint. · You have a fever. · You have new belly pain, or your pain gets worse. Watch closely for changes in your health, and be sure to contact your doctor if:  · Your vaginal bleeding seems to be getting heavier. · You have new or worse vaginal discharge. · You feel sad, anxious, or hopeless for more than a few days. · You do not get better as expected. Where can you learn more? Go to http://javi-melvin.info/. Enter A461 in the search box to learn more about \"After Your Delivery (the Postpartum Period): Care Instructions. \"  Current as of: 2017  Content Version: 11.3  © 3631-8711 [employer]. Care instructions adapted under license by [employer] (which disclaims liability or warranty for this information). If you have questions about a medical condition or this instruction, always ask your healthcare professional. [address] any warranty or liability for your use of this information.

## 2017-09-08 NOTE — LACTATION NOTE
This note was copied from a baby's chart. Baby nursing well (according to mom) and has improved throughout post partum stay, deep latch maintained, mother is comfortable, milk is in transition, baby feeding vigorously with rhythmic suck, swallow, breathe pattern, with audible swallowing, and evident milk transfer, both breasts offerd, baby is asleep following feeding. Baby is feeding on demand, voiding and stools present as appropriate over the last 24 hours. Breasts may become engorged when milk \"comes in\". How milk is made / normal phases of milk production, supply and demand discussed. Taught care of engorged breasts - frequent breastfeeding encouraged, warm compresses and breast massage ac. Then nurse the baby or pump. Apply cold compresses pc x 15 minutes a few times a day for swelling or discomfort. May need to do this care for a couple of days. Mastitis prevention and treatment discussed. Mom has information regarding [place] for follow up if needed.

## 2017-09-08 NOTE — ROUTINE PROCESS
Bedside shift change report given to Bisi Rahman RN (oncoming nurse) by CGregg Sharma [facility], RN (offgoing nurse). Report included the following information SBAR.

## 2017-09-08 NOTE — PROGRESS NOTES
Post-Partum Day Number 2 Progress Note    Gerline Common     Assessment: Doing well, post partum day 2    Plan:   1. Discharge home today  2. Follow up in office in 6 weeks with Frances Francois MD  3. Post partum activity advised, diet as tolerated  4. Discharge Medications: pain medicines as taken in hospital, and medications prior to admission  5. GHTN- rare mildly elevated BP    Information for the patient's :  Juanita Oakes [[id number]]   Vaginal, Spontaneous Delivery   Patient doing well without significant complaint. Voiding without difficulty, normal lochia. Vitals:  Visit Vitals    /84 (BP 1 Location: Right arm, BP Patient Position: At rest)    Pulse 64    Temp 97.7 °F (36.5 °C)    Resp 16    Ht 5' 5\" (1.651 m)    Wt 109.8 kg (242 lb)    SpO2 99%    Breastfeeding No    BMI 40.27 kg/m2     Temp (24hrs), Av.1 °F (36.7 °C), Min:97.7 °F (36.5 °C), Max:98.4 °F (36.9 °C)      Exam:         Patient without distress. Abdomen soft, fundus firm, nontender                 Lower extremities are symmetric without tenderness, cords or erythema. Labs:     Lab Results   Component Value Date/Time    WBC 6.7 2017 08:59 AM    WBC 7.3 10/21/2009 10:00 AM    HGB 10.8 2017 08:59 AM    HGB 12.6 10/21/2009 10:00 AM    HCT 33.0 2017 08:59 AM    HCT 34.7 10/21/2009 10:00 AM    PLATELET 555  08:59 AM    PLATELET 144  10:00 AM       No results found for this or any previous visit (from the past 24 hour(s)).

## 2019-02-09 ENCOUNTER — HOSPITAL ENCOUNTER (EMERGENCY)
Age: 34
Discharge: HOME | End: 2019-02-09
Attending: EMERGENCY MEDICINE
Payer: COMMERCIAL

## 2019-02-09 ENCOUNTER — APPOINTMENT (OUTPATIENT)
Dept: CT IMAGING | Age: 34
End: 2019-02-09
Attending: PHYSICIAN ASSISTANT
Payer: COMMERCIAL

## 2019-02-09 VITALS
OXYGEN SATURATION: 99 % | SYSTOLIC BLOOD PRESSURE: 134 MMHG | RESPIRATION RATE: 17 BRPM | TEMPERATURE: 97.8 F | HEART RATE: 80 BPM | DIASTOLIC BLOOD PRESSURE: 78 MMHG

## 2019-02-09 DIAGNOSIS — G43.801 OTHER MIGRAINE WITH STATUS MIGRAINOSUS, NOT INTRACTABLE: Primary | ICD-10-CM

## 2019-02-09 LAB
ALBUMIN SERPL-MCNC: 3.3 G/DL (ref 3.5–5)
ALBUMIN/GLOB SERPL: 0.7 {RATIO} (ref 1.1–2.2)
ALP SERPL-CCNC: 71 U/L (ref 45–117)
ALT SERPL-CCNC: 18 U/L (ref 12–78)
ANION GAP SERPL CALC-SCNC: 8 MMOL/L (ref 5–15)
AST SERPL-CCNC: 23 U/L (ref 15–37)
BASOPHILS # BLD: 0 K/UL (ref 0–0.1)
BASOPHILS NFR BLD: 0 % (ref 0–1)
BILIRUB SERPL-MCNC: 0.2 MG/DL (ref 0.2–1)
BUN SERPL-MCNC: 13 MG/DL (ref 6–20)
BUN/CREAT SERPL: 16 (ref 12–20)
CALCIUM SERPL-MCNC: 9.1 MG/DL (ref 8.5–10.1)
CHLORIDE SERPL-SCNC: 107 MMOL/L (ref 97–108)
CO2 SERPL-SCNC: 25 MMOL/L (ref 21–32)
COMMENT, HOLDF: NORMAL
CREAT SERPL-MCNC: 0.82 MG/DL (ref 0.55–1.02)
DIFFERENTIAL METHOD BLD: ABNORMAL
EOSINOPHIL # BLD: 0.1 K/UL (ref 0–0.4)
EOSINOPHIL NFR BLD: 1 % (ref 0–7)
ERYTHROCYTE [DISTWIDTH] IN BLOOD BY AUTOMATED COUNT: 11.9 % (ref 11.5–14.5)
GLOBULIN SER CALC-MCNC: 4.8 G/DL (ref 2–4)
GLUCOSE SERPL-MCNC: 121 MG/DL (ref 65–100)
HCT VFR BLD AUTO: 41.5 % (ref 35–47)
HGB BLD-MCNC: 14 G/DL (ref 11.5–16)
IMM GRANULOCYTES # BLD AUTO: 0 K/UL (ref 0–0.04)
IMM GRANULOCYTES NFR BLD AUTO: 0 % (ref 0–0.5)
LYMPHOCYTES # BLD: 4.1 K/UL (ref 0.8–3.5)
LYMPHOCYTES NFR BLD: 49 % (ref 12–49)
MCH RBC QN AUTO: 30.5 PG (ref 26–34)
MCHC RBC AUTO-ENTMCNC: 33.7 G/DL (ref 30–36.5)
MCV RBC AUTO: 90.4 FL (ref 80–99)
MONOCYTES # BLD: 0.6 K/UL (ref 0–1)
MONOCYTES NFR BLD: 7 % (ref 5–13)
NEUTS SEG # BLD: 3.6 K/UL (ref 1.8–8)
NEUTS SEG NFR BLD: 42 % (ref 32–75)
NRBC # BLD: 0 K/UL (ref 0–0.01)
NRBC BLD-RTO: 0 PER 100 WBC
PLATELET # BLD AUTO: 257 K/UL (ref 150–400)
PMV BLD AUTO: 9.3 FL (ref 8.9–12.9)
POTASSIUM SERPL-SCNC: 3.8 MMOL/L (ref 3.5–5.1)
PROT SERPL-MCNC: 8.1 G/DL (ref 6.4–8.2)
RBC # BLD AUTO: 4.59 M/UL (ref 3.8–5.2)
SAMPLES BEING HELD,HOLD: NORMAL
SODIUM SERPL-SCNC: 140 MMOL/L (ref 136–145)
WBC # BLD AUTO: 8.4 K/UL (ref 3.6–11)

## 2019-02-09 PROCEDURE — 70450 CT HEAD/BRAIN W/O DYE: CPT

## 2019-02-09 PROCEDURE — 96375 TX/PRO/DX INJ NEW DRUG ADDON: CPT

## 2019-02-09 PROCEDURE — 99282 EMERGENCY DEPT VISIT SF MDM: CPT

## 2019-02-09 PROCEDURE — 96372 THER/PROPH/DIAG INJ SC/IM: CPT

## 2019-02-09 PROCEDURE — 74011636637 HC RX REV CODE- 636/637: Performed by: PHYSICIAN ASSISTANT

## 2019-02-09 PROCEDURE — 74011250636 HC RX REV CODE- 250/636: Performed by: PHYSICIAN ASSISTANT

## 2019-02-09 PROCEDURE — 85025 COMPLETE CBC W/AUTO DIFF WBC: CPT

## 2019-02-09 PROCEDURE — 96361 HYDRATE IV INFUSION ADD-ON: CPT

## 2019-02-09 PROCEDURE — 36415 COLL VENOUS BLD VENIPUNCTURE: CPT

## 2019-02-09 PROCEDURE — 96374 THER/PROPH/DIAG INJ IV PUSH: CPT

## 2019-02-09 PROCEDURE — 80053 COMPREHEN METABOLIC PANEL: CPT

## 2019-02-09 RX ORDER — PROCHLORPERAZINE EDISYLATE 5 MG/ML
10 INJECTION INTRAMUSCULAR; INTRAVENOUS
Status: DISCONTINUED | OUTPATIENT
Start: 2019-02-09 | End: 2019-02-09 | Stop reason: SDUPTHER

## 2019-02-09 RX ORDER — PROCHLORPERAZINE EDISYLATE 5 MG/ML
10 INJECTION INTRAMUSCULAR; INTRAVENOUS
Status: COMPLETED | OUTPATIENT
Start: 2019-02-09 | End: 2019-02-09

## 2019-02-09 RX ORDER — KETOROLAC TROMETHAMINE 30 MG/ML
30 INJECTION, SOLUTION INTRAMUSCULAR; INTRAVENOUS
Status: COMPLETED | OUTPATIENT
Start: 2019-02-09 | End: 2019-02-09

## 2019-02-09 RX ORDER — SUMATRIPTAN 6 MG/.5ML
6 INJECTION, SOLUTION SUBCUTANEOUS
Status: COMPLETED | OUTPATIENT
Start: 2019-02-09 | End: 2019-02-09

## 2019-02-09 RX ORDER — SODIUM CHLORIDE 9 MG/ML
1000 INJECTION, SOLUTION INTRAVENOUS ONCE
Status: COMPLETED | OUTPATIENT
Start: 2019-02-09 | End: 2019-02-09

## 2019-02-09 RX ORDER — SODIUM CHLORIDE 0.9 % (FLUSH) 0.9 %
10 SYRINGE (ML) INJECTION AS NEEDED
Status: DISCONTINUED | OUTPATIENT
Start: 2019-02-09 | End: 2019-02-10 | Stop reason: HOSPADM

## 2019-02-09 RX ORDER — SUMATRIPTAN 25 MG/1
25 TABLET, FILM COATED ORAL
Qty: 10 TAB | Refills: 0 | Status: SHIPPED | OUTPATIENT
Start: 2019-02-09 | End: 2019-02-09

## 2019-02-09 RX ORDER — DIPHENHYDRAMINE HYDROCHLORIDE 50 MG/ML
25 INJECTION, SOLUTION INTRAMUSCULAR; INTRAVENOUS
Status: COMPLETED | OUTPATIENT
Start: 2019-02-09 | End: 2019-02-09

## 2019-02-09 RX ADMIN — PROCHLORPERAZINE EDISYLATE 10 MG: 5 INJECTION INTRAMUSCULAR; INTRAVENOUS at 21:07

## 2019-02-09 RX ADMIN — Medication 10 ML: at 21:14

## 2019-02-09 RX ADMIN — SUMATRIPTAN SUCCINATE 6 MG: 6 INJECTION SUBCUTANEOUS at 22:00

## 2019-02-09 RX ADMIN — KETOROLAC TROMETHAMINE 30 MG: 30 INJECTION, SOLUTION INTRAMUSCULAR at 21:07

## 2019-02-09 RX ADMIN — SODIUM CHLORIDE 1000 ML/HR: 900 INJECTION, SOLUTION INTRAVENOUS at 21:05

## 2019-02-09 RX ADMIN — DIPHENHYDRAMINE HYDROCHLORIDE 25 MG: 50 INJECTION, SOLUTION INTRAMUSCULAR; INTRAVENOUS at 21:07

## 2019-02-10 NOTE — DISCHARGE INSTRUCTIONS
Patient Education        Migraine Headache: Care Instructions  Your Care Instructions  Migraines are painful, throbbing headaches that often start on one side of the head. They may cause nausea and vomiting and make you sensitive to light, sound, or smell. Without treatment, migraines can last from 4 hours to a few days. Medicines can help prevent migraines or stop them after they have started. Your doctor can help you find which ones work best for you. Follow-up care is a key part of your treatment and safety. Be sure to make and go to all appointments, and call your doctor if you are having problems. It's also a good idea to know your test results and keep a list of the medicines you take. How can you care for yourself at home? · Do not drive if you have taken a prescription pain medicine. · Rest in a quiet, dark room until your headache is gone. Close your eyes, and try to relax or go to sleep. Don't watch TV or read. · Put a cold, moist cloth or cold pack on the painful area for 10 to 20 minutes at a time. Put a thin cloth between the cold pack and your skin. · Use a warm, moist towel or a heating pad set on low to relax tight shoulder and neck muscles. · Have someone gently massage your neck and shoulders. · Take your medicines exactly as prescribed. Call your doctor if you think you are having a problem with your medicine. You will get more details on the specific medicines your doctor prescribes. · Be careful not to take pain medicine more often than the instructions allow. You could get worse or more frequent headaches when the medicine wears off. To prevent migraines  · Keep a headache diary so you can figure out what triggers your headaches. Avoiding triggers may help you prevent headaches. Record when each headache began, how long it lasted, and what the pain was like.  (Was it throbbing, aching, stabbing, or dull?) Write down any other symptoms you had with the headache, such as nausea, flashing lights or dark spots, or sensitivity to bright light or loud noise. Note if the headache occurred near your period. List anything that might have triggered the headache. Triggers may include certain foods (chocolate, cheese, wine) or odors, smoke, bright light, stress, or lack of sleep. · If your doctor has prescribed medicine for your migraines, take it as directed. You may have medicine that you take only when you get a migraine and medicine that you take all the time to help prevent migraines. ? If your doctor has prescribed medicine for when you get a headache, take it at the first sign of a migraine, unless your doctor has given you other instructions. ? If your doctor has prescribed medicine to prevent migraines, take it exactly as prescribed. Call your doctor if you think you are having a problem with your medicine. · Find healthy ways to deal with stress. Migraines are most common during or right after stressful times. Take time to relax before and after you do something that has caused a migraine in the past.  · Try to keep your muscles relaxed by keeping good posture. Check your jaw, face, neck, and shoulder muscles for tension. Try to relax them. When you sit at a desk, change positions often. And make sure to stretch for 30 seconds each hour. · Get plenty of sleep and exercise. · Eat meals on a regular schedule. Avoid foods and drinks that often trigger migraines. These include chocolate, alcohol (especially red wine and port), aspartame, monosodium glutamate (MSG), and some additives found in foods (such as hot dogs, poe, cold cuts, aged cheeses, and pickled foods). · Limit caffeine. Don't drink too much coffee, tea, or soda. But don't quit caffeine suddenly. That can also give you migraines. · Do not smoke or allow others to smoke around you. If you need help quitting, talk to your doctor about stop-smoking programs and medicines.  These can increase your chances of quitting for good.  · If you are taking birth control pills or hormone therapy, talk to your doctor about whether they are triggering your migraines. When should you call for help? Call 911 anytime you think you may need emergency care. For example, call if:    · You have signs of a stroke. These may include:  ? Sudden numbness, paralysis, or weakness in your face, arm, or leg, especially on only one side of your body. ? Sudden vision changes. ? Sudden trouble speaking. ? Sudden confusion or trouble understanding simple statements. ? Sudden problems with walking or balance. ? A sudden, severe headache that is different from past headaches.    Call your doctor now or seek immediate medical care if:    · You have new or worse nausea and vomiting.     · You have a new or higher fever.     · Your headache gets much worse.    Watch closely for changes in your health, and be sure to contact your doctor if:    · You are not getting better after 2 days (48 hours). Where can you learn more? Go to http://javi-melvin.info/. Enter P576 in the search box to learn more about \"Migraine Headache: Care Instructions. \"  Current as of: Suzanne 3, 2018  Content Version: 11.9  © 1540-5284 [employer]. Care instructions adapted under license by [employer] (which disclaims liability or warranty for this information). If you have questions about a medical condition or this instruction, always ask your healthcare professional. [place] [address] any warranty or liability for your use of this information.

## 2019-02-10 NOTE — ED PROVIDER NOTES
35 y.o. female with no significant past medical history who presents ambulatory to the ED, accompanied by family, with chief complaint of 9/10 throbbing, sharp headache, with associated N/V (1 episode/day for last 4 days), dizziness, lightheadedness, and photophobia, onset about week ago. Pt notes that she went to an urgent care enter on 02/04/19, received Decadron, Reglan, Benadryl and Toradol, which helped dull the pain, but did not remove it completely; she has been taking the Fioricet (d/c'd with a script) as prescribed, but it has not helped. She was advised to go to the ED if her sx continued to persist. Pt denies neck pain,/stiffness, numbness/tignling, weakness, CP, SOB, abd pain and diarrhea. Pt notes that she has not had a migraine in several years, does not recall what her triggers were and is not currently on any daily medications for headache suppression. There are no other acute medical concerns at this time. Negative Tobacco use; Negative EtOH use; Negative Illicit Drug Abuse PCP: Rk Go MD 
 
Note written by Jose Guadalupe So, as dictated by Heber Garnica PA-C 9:00 PM  
 
 
The history is provided by the patient and medical records. No  was used. Past Medical History:  
Diagnosis Date  Depression  Sinusitis 2011  
 (right sphenoid) Past Surgical History:  
Procedure Laterality Date Yamilyordy Marquez ORTHOPAEDIC  2008 ORIF (left ankle)  HX OTHER SURGICAL    
 L ankle surgery 2008 Family History:  
Problem Relation Age of Onset  Hypertension Mother  Breast Cancer Mother (x2)  Hypertension Father  Diabetes Father Social History Socioeconomic History  Marital status:  Spouse name: Not on file  Number of children: Not on file  Years of education: Not on file  Highest education level: Not on file Social Needs  Financial resource strain: Not on file  Food insecurity - worry: Not on file  Food insecurity - inability: Not on file  Transportation needs - medical: Not on file  Transportation needs - non-medical: Not on file Occupational History  Not on file Tobacco Use  Smoking status: Never Smoker  Smokeless tobacco: Never Used Substance and Sexual Activity  Alcohol use: No  
  Alcohol/week: 0.5 - 1.0 oz Types: 1 - 2 Standard drinks or equivalent per week  Drug use: No  
 Sexual activity: Yes  
  Partners: Male Birth control/protection: None Other Topics Concern  Not on file Social History Narrative  Not on file ALLERGIES: Erythromycin and Pcn [penicillins] Review of Systems Constitutional: Negative. Negative for chills, fatigue and fever. HENT: Negative. Negative for congestion, ear pain, facial swelling, rhinorrhea, sneezing and sore throat. Eyes: Positive for photophobia. Negative for pain, discharge and itching. Respiratory: Negative for cough, chest tightness and shortness of breath. Cardiovascular: Negative. Negative for chest pain and leg swelling. Gastrointestinal: Positive for nausea and vomiting. Negative for abdominal distention, abdominal pain, constipation and diarrhea. Genitourinary: Negative for difficulty urinating, frequency and urgency. Musculoskeletal: Negative for arthralgias, back pain, joint swelling, neck pain and neck stiffness. Skin: Negative for color change and rash. Neurological: Positive for dizziness, light-headedness and headaches. Negative for weakness and numbness. Psychiatric/Behavioral: Negative for confusion and decreased concentration. All other systems reviewed and are negative. Vitals:  
 02/09/19 1953 BP: 137/88 Pulse: 78 Resp: 16 Temp: 97.5 °F (36.4 °C) SpO2: 98% Physical Exam  
Constitutional: She is oriented to person, place, and time. She appears well-developed and well-nourished. No distress. Well appearing AAF in NAD HENT:  
Head: Normocephalic and atraumatic. Right Ear: Tympanic membrane, external ear and ear canal normal.  
Left Ear: Tympanic membrane, external ear and ear canal normal.  
Eyes: Conjunctivae are normal. Pupils are equal, round, and reactive to light. Neck: Normal range of motion. Neck supple. No meningeal irritation Cardiovascular: Normal rate, regular rhythm and normal heart sounds. Pulmonary/Chest: Effort normal. No respiratory distress. She has no wheezes. Abdominal: Soft. Bowel sounds are normal. She exhibits no distension. There is no tenderness. There is no rebound. Musculoskeletal: Normal range of motion. Neurological: She is alert and oriented to person, place, and time. No cranial nerve deficit or sensory deficit. She exhibits normal muscle tone. Coordination normal.  
Skin: Skin is warm and dry. No ecchymosis, no laceration and no lesion noted. Psychiatric: She has a normal mood and affect. Her behavior is normal.  
Nursing note and vitals reviewed. MDM Number of Diagnoses or Management Options Other migraine with status migrainosus, not intractable:  
Diagnosis management comments: 34 yo AAF with hx of migraines without recent episode in 10 yrs c/o migraine x one week refractory to home analgesia. Appears well without fever, meningeal irritation or e/o neuro abnormality. Will check labs and Ct head secondary to prolonged presentation Plan CBC 
CMP 
CT head IVf 
toradol Compazine 
benadry Imitrex Reassess. Estrellita ALEJANDRE [facility] [place] Amount and/or Complexity of Data Reviewed Clinical lab tests: ordered and reviewed Tests in the radiology section of CPT®: ordered and reviewed Independent visualization of images, tracings, or specimens: yes Procedures Progress note Labs and imaging reviewed. Unremarkable. Feeling better.  Estrellita Larose [place] 
 
 Patient's results have been reviewed with them. Patient and/or family have verbally conveyed their understanding and agreement of the patient's signs, symptoms, diagnosis, treatment and prognosis and additionally agree to follow up as recommended or return to the Emergency Room should their condition change prior to follow-up. Discharge instructions have also been provided to the patient with some educational information regarding their diagnosis as well a list of reasons why they would want to return to the ER prior to their follow-up appointment should their condition change.  Estrellita [facility]

## 2019-02-10 NOTE — ED NOTES
[id number] PA reviewed discharge instructions with the patient. The patient verbalized understanding. Patient ambulated out of ED with . No complaints or concerns noted.

## 2019-02-10 NOTE — ED TRIAGE NOTES
TRIAGE NOTE:   Patient arrives with c/o migraine x 1 week. Patient reports was seen at Urgent care this week and was given decadron, benadryl and toradol. Patient reports nothing has been helping pain.

## 2019-03-05 ENCOUNTER — OFFICE VISIT (OUTPATIENT)
Dept: NEUROLOGY | Age: 34
End: 2019-03-05

## 2019-03-05 VITALS
BODY MASS INDEX: 39.99 KG/M2 | HEIGHT: 65 IN | OXYGEN SATURATION: 100 % | DIASTOLIC BLOOD PRESSURE: 86 MMHG | RESPIRATION RATE: 16 BRPM | SYSTOLIC BLOOD PRESSURE: 126 MMHG | WEIGHT: 240 LBS | HEART RATE: 76 BPM

## 2019-03-05 DIAGNOSIS — G43.709 CHRONIC MIGRAINE WITHOUT AURA WITHOUT STATUS MIGRAINOSUS, NOT INTRACTABLE: Primary | ICD-10-CM

## 2019-03-05 DIAGNOSIS — G43.709 TRANSFORMED MIGRAINE WITHOUT AURA: ICD-10-CM

## 2019-03-05 RX ORDER — METHYLPREDNISOLONE 4 MG/1
TABLET ORAL
Qty: 1 DOSE PACK | Refills: 0 | Status: SHIPPED | OUTPATIENT
Start: 2019-03-05 | End: 2019-04-26 | Stop reason: ALTCHOICE

## 2019-03-05 RX ORDER — SUMATRIPTAN 50 MG/1
50 TABLET, FILM COATED ORAL
Qty: 9 TAB | Refills: 2 | Status: SHIPPED | OUTPATIENT
Start: 2019-03-05 | End: 2019-03-05

## 2019-03-05 RX ORDER — SUMATRIPTAN 25 MG/1
25 TABLET, FILM COATED ORAL
COMMUNITY
End: 2019-03-05 | Stop reason: DRUGHIGH

## 2019-03-05 NOTE — PROGRESS NOTES
Chief Complaint   Patient presents with    Migraine         HISTORY OF PRESENT ILLNESS  Wicho Carmona is a 35 y.o. female who came in for evaluation of headaches. She says that she has had for a long time but about a month ago they become more intense and frequent. She describes her headaches as mainly unilateral, intense pain, associated with light sensitivity, noise sensitivity, nausea and vomiting. She was seen in the emergency department and was advised to follow-up with neurology. She used to take NSAIDs which would work but they stopped working about a month ago. In the emergency department she was given Imitrex injection which worked. She is now taking sumatriptan 25 mg as needed which helps. She has never been on any prophylactics  Denies any other associated neurological sym ptoms. No blurring of vision or diplopia. She does not know of any triggers  She works as a research analyst at [employer]    Past Medical History:   Diagnosis Date    Depression     Sinusitis 2011    (right sphenoid)     Current Outpatient Medications   Medication Sig    methylPREDNISolone (MEDROL DOSEPACK) 4 mg tablet Take as diected    SUMAtriptan (IMITREX) 50 mg tablet Take 1 Tab by mouth once as needed for Migraine for up to 1 dose. May repeat x 1 in 2 hrs    PNV66-Iron Fumarate-FA-DSS-DHA 26-1.2- mg cap Take  by mouth. Indications: Pregnancy    ibuprofen (MOTRIN) 800 mg tablet Take 1 Tab by mouth every eight (8) hours as needed for Pain. No current facility-administered medications for this visit. Allergies   Allergen Reactions    Erythromycin Nausea and Vomiting    Pcn [Penicillins] Hives     Family History   Problem Relation Age of Onset    Hypertension Mother     Breast Cancer Mother         (x2)    Hypertension Father     Diabetes Father      Social History     Tobacco Use    Smoking status: Never Smoker    Smokeless tobacco: Never Used   Substance Use Topics    Alcohol use:  No Alcohol/week: 0.5 - 1.0 oz     Types: 1 - 2 Standard drinks or equivalent per week    Drug use: No     Past Surgical History:   Procedure Laterality Date    HX ORTHOPAEDIC  2008    ORIF (left ankle)    HX OTHER SURGICAL      L ankle surgery 2008         REVIEW OF SYSTEMS  Review of Systems - History obtained from the patient  Psychological ROS: negative  ENT ROS: negative  Hematological and Lymphatic ROS: negative  Endocrine ROS: negative  Respiratory ROS: no cough, shortness of breath, or wheezing  Cardiovascular ROS: no chest pain or dyspnea on exertion  Gastrointestinal ROS: no abdominal pain, change in bowel habits, or black or bloody stools  Genito-Urinary ROS: no dysuria, trouble voiding, or hematuria  Musculoskeletal ROS: negative  Dermatological ROS: negative      PHYSICAL EXAMINATION:    Visit Vitals  /86   Pulse 76   Resp 16   Ht 5' 5\" (1.651 m)   Wt 108.9 kg (240 lb)   SpO2 100%   BMI 39.94 kg/m²     General:  Well defined, nourished, and groomed individual in no acute distress. Neck: Supple, nontender,no bruits, no pain with resistance to active range of motion. Heart: Regular rate and rhythm, no murmurs, rub, or gallop. Normal S1S2. Lungs:  Clear to auscultation bilaterally with equal chest expansion, no cough, no wheeze  Musculoskeletal:  Extremities revealed no edema and had full range of motion of joints. Psych:  Good mood and bright affect    NEUROLOGICAL EXAMINATION:     Mental Status:   Alert and oriented to person, place, and time with recent and remote memory intact. Attention span and concentration are normal. Speech is fluent with a full fund of knowledge. Cranial Nerves:    II, III, IV, VI:  Visual acuity grossly intact. Visual fields are normal.    Pupils are equal, round, and reactive to light and accommodation. Extra-ocular movements are full and fluid. Fundoscopic exam was benign, no ptosis or nystagmus. V-XII: Hearing is grossly intact.   Facial features are symmetric, with normal sensation and strength. The palate rises symmetrically and the tongue protrudes midline. Sternocleidomastoids 5/5. Motor Examination: Normal tone, bulk, and strength. 5/5 muscle strength throughout. No cogwheel rigidity or clonus present. Sensory exam:  Normal throughout to pinprick, temperature, and vibration sense. Normal proprioception. Coordination:  Finger to nose and rapid arm movement testing was normal.   No resting or intention tremor    Gait and Station:  Steady. Normal arm swing. No Rhomberg or pronator drift. No muscle wasting or fasiculations noted. Reflexes:  DTRs 2+ throughout. Toes downgoing. LABS / IMAGING  CT Results (most recent):  Results from Hospital Encounter encounter on 02/09/19   CT HEAD WO CONT    Narrative EXAM: CT HEAD WO CONT    INDICATION: HA x one week    COMPARISON: 7/1/2011. CONTRAST: None. TECHNIQUE: Unenhanced CT of the head was performed using 5 mm images. Brain and  bone windows were generated. CT dose reduction was achieved through use of a  standardized protocol tailored for this examination and automatic exposure  control for dose modulation. FINDINGS:  The ventricles and sulci are normal in size, shape and configuration and  midline. There is no significant white matter disease. There is no intracranial  hemorrhage, extra-axial collection, mass, mass effect or midline shift. The  basilar cisterns are open. No acute infarct is identified. The bone windows  demonstrate no abnormalities. The visualized portions of the paranasal sinuses  and mastoid air cells are clear. Impression IMPRESSION: No acute intracranial abnormality. ASSESSMENT    ICD-10-CM ICD-9-CM    1. Chronic migraine without aura without status migrainosus, not intractable G43.709 346.70 SUMAtriptan (IMITREX) 50 mg tablet   2.  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 Zana Soria  has common migraines and currently seems to be going through a transformed migraine  Treatment strategies for migraines were reviewed at length including potential dietary and environmental triggers. To keep a headache log so as to identify and avoid those  Try sumatriptan 50 with the onset of a headache with or without an NSAID  Short course of steroid was given to break her current headache cycle  Different nutritional supplements that can be used for headache prophylaxis were discussed. She should try riboflavin and magnesium  Follow-up in 6 weeks and if headache frequency does not improve, will consider a prescription prophylactics    Susan Grant MD  Diplomate, American Board of Psychiatry & Neurology (Neurology)  Diplomate, American Board of Psychiatry & Neurology (Clinical Neurophysiology)  Diplomate, American Board of Electrodiagnostic Medicine  This note will not be viewable in [address]

## 2019-03-05 NOTE — PATIENT INSTRUCTIONS
A Healthy Lifestyle: Care Instructions  Your Care Instructions    A healthy lifestyle can help you feel good, stay at a healthy weight, and have plenty of energy for both work and play. A healthy lifestyle is something you can share with your whole family. A healthy lifestyle also can lower your risk for serious health problems, such as high blood pressure, heart disease, and diabetes. You can follow a few steps listed below to improve your health and the health of your family. Follow-up care is a key part of your treatment and safety. Be sure to make and go to all appointments, and call your doctor if you are having problems. It's also a good idea to know your test results and keep a list of the medicines you take. How can you care for yourself at home? · Do not eat too much sugar, fat, or fast foods. You can still have dessert and treats now and then. The goal is moderation. · Start small to improve your eating habits. Pay attention to portion sizes, drink less juice and soda pop, and eat more fruits and vegetables. ? Eat a healthy amount of food. A 3-ounce serving of meat, for example, is about the size of a deck of cards. Fill the rest of your plate with vegetables and whole grains. ? Limit the amount of soda and sports drinks you have every day. Drink more water when you are thirsty. ? Eat at least 5 servings of fruits and vegetables every day. It may seem like a lot, but it is not hard to reach this goal. A serving or helping is 1 piece of fruit, 1 cup of vegetables, or 2 cups of leafy, raw vegetables. Have an apple or some carrot sticks as an afternoon snack instead of a candy bar. Try to have fruits and/or vegetables at every meal.  · Make exercise part of your daily routine. You may want to start with simple activities, such as walking, bicycling, or slow swimming. Try to be active 30 to 60 minutes every day. You do not need to do all 30 to 60 minutes all at once.  For example, you can exercise 3 times a day for 10 or 20 minutes. Moderate exercise is safe for most people, but it is always a good idea to talk to your doctor before starting an exercise program.  · Keep moving. Kady Banindia the lawn, work in the garden, or [employer]. Take the stairs instead of the elevator at work. · If you smoke, quit. People who smoke have an increased risk for heart attack, stroke, cancer, and other lung illnesses. Quitting is hard, but there are ways to boost your chance of quitting tobacco for good. ? Use nicotine gum, patches, or lozenges. ? Ask your doctor about stop-smoking programs and medicines. ? Keep trying. In addition to reducing your risk of diseases in the future, you will notice some benefits soon after you stop using tobacco. If you have shortness of breath or asthma symptoms, they will likely get better within a few weeks after you quit. · Limit how much alcohol you drink. Moderate amounts of alcohol (up to 2 drinks a day for men, 1 drink a day for women) are okay. But drinking too much can lead to liver problems, high blood pressure, and other health problems. Family health  If you have a family, there are many things you can do together to improve your health. · Eat meals together as a family as often as possible. · Eat healthy foods. This includes fruits, vegetables, lean meats and dairy, and whole grains. · Include your family in your fitness plan. Most people think of activities such as jogging or tennis as the way to fitness, but there are many ways you and your family can be more active. Anything that makes you breathe hard and gets your heart pumping is exercise. Here are some tips:  ? Walk to do errands or to take your child to school or the bus.  ? Go for a family bike ride after dinner instead of watching TV. Where can you learn more? Go to http://javi-melvin.info/. Enter I687 in the search box to learn more about \"A Healthy Lifestyle: Care Instructions. \"  Current as of: September 11, 2018  Content Version: 11.9  © 9890-8777 [employer]. Care instructions adapted under license by [employer] (which disclaims liability or warranty for this information). If you have questions about a medical condition or this instruction, always ask your healthcare professional. [address] any warranty or liability for your use of this information. [facility]   Statement to Patients  April 1, 2014      In an effort to ensure the large volume of patient prescription refills is processed in the most efficient and expeditious manner, we are asking our patients to assist us by calling your Pharmacy for all prescription refills, this will include also your  Mail Order Pharmacy. The pharmacy will contact our office electronically to continue the refill process. Please do not wait until the last minute to call your pharmacy. We need at least 48 hours (2days) to fill prescriptions. We also encourage you to call your pharmacy before going to  your prescription to make sure it is ready. With regard to controlled substance prescription refill requests (narcotic refills) that need to be picked up at our office, we ask your cooperation by providing us with at least 72 hours (3days) notice that you will need a refill. We will not refill narcotic prescription refill requests after 4:00pm on any weekday, Monday through Thursday, or after 2:00pm on Fridays, or on the weekends. We encourage everyone to explore another way of getting your prescription refill request processed using [employer] Messenger (MM), our patient web portal through our electronic medical record system. [employer] Messenger (MM) is an efficient and effective way to communicate your medication request directly to the office and  downloadable as an miller on your smart phone .  [employer] Messenger (MM) also features a review functionality that allows you to view your medication list as well as leave messages for your physician. Are you ready to get connected? If so please review the attatched instructions or speak to any of our staff to get you set up right away! Thank you so much for your cooperation. Should you have any questions please contact our Practice Administrator.     The Physicians and Staff,  [facility]

## 2019-04-26 ENCOUNTER — OFFICE VISIT (OUTPATIENT)
Dept: PRIMARY CARE CLINIC | Age: 34
End: 2019-04-26

## 2019-04-26 VITALS
SYSTOLIC BLOOD PRESSURE: 136 MMHG | DIASTOLIC BLOOD PRESSURE: 80 MMHG | TEMPERATURE: 98.4 F | RESPIRATION RATE: 18 BRPM | HEIGHT: 65 IN | OXYGEN SATURATION: 98 % | BODY MASS INDEX: 40.42 KG/M2 | HEART RATE: 75 BPM | WEIGHT: 242.6 LBS

## 2019-04-26 DIAGNOSIS — E66.01 OBESITY, MORBID (HCC): ICD-10-CM

## 2019-04-26 DIAGNOSIS — G43.009 MIGRAINE WITHOUT AURA AND WITHOUT STATUS MIGRAINOSUS, NOT INTRACTABLE: Primary | ICD-10-CM

## 2019-04-26 PROBLEM — Z37.9 NORMAL LABOR: Status: RESOLVED | Noted: 2017-09-06 | Resolved: 2019-04-26

## 2019-04-26 RX ORDER — SUMATRIPTAN 50 MG/1
50 TABLET, FILM COATED ORAL
COMMUNITY
End: 2020-03-05

## 2019-04-26 RX ORDER — BUPROPION HYDROCHLORIDE 150 MG/1
TABLET, EXTENDED RELEASE ORAL 2 TIMES DAILY
COMMUNITY

## 2019-04-26 RX ORDER — NALTREXONE HYDROCHLORIDE 50 MG/1
50 TABLET, FILM COATED ORAL DAILY
COMMUNITY

## 2019-04-26 NOTE — PROGRESS NOTES
Written by Luciana Bermudez, as dictated by Dr. Nishant Perez MD. 
 
Leana Justin is a 35 y.o. female. HPI The patient comes in today to establish care. She was previously under the care of Dr. Darshan Courtney (). Patient had labs drawn on 04/05/2019, which were normal. 
 
She is seeing Dr. Yumiko Vogt (weightloss at [address] [place] [address]) and was prescribed recently prescribed naltrexone 50 mg and Wellbutrin dosage was increased to 150 mg. She notes that she has only lost about 10 lbs since she started with the practice in 2016, but pt has had a baby since then. Patient tryied to follow a keto diet without much success as she craves carbohydrates. She tried Weight Watchers in the past, noting she has difficulty sticking to plans. The pt notes that she walks twice/week. BP is high today at 138/92, 136/80 on repeat. Pt notes that her BP was normal when checked at the weight loss clinic earlier today. She has FHX of HTN (mother & father). Occasionally she has migraines, for which she uses Imitrex 50 mg as needed. Pt has discussed Topamax with Dr. Justen Vicente but will not start medication at this time. Denies HX of glaucoma. Followed by OB/GYN. She works at [address] [place] [address]. Patient Active Problem List  
Diagnosis Code  Obesity, morbid ([facility] [address] E66.01  
  
 
Current Outpatient Medications on File Prior to Visit Medication Sig Dispense Refill  SUMAtriptan (IMITREX) 50 mg tablet Take 50 mg by mouth once as needed for Migraine.  buPROPion SR (WELLBUTRIN SR) 150 mg SR tablet Take  by mouth two (2) times a day.  naltrexone (DEPADE) 50 mg tablet Take 50 mg by mouth daily.  PNV66-Iron Fumarate-FA-DSS-DHA 26-1.2- mg cap Take  by mouth. Indications: Pregnancy No current facility-administered medications on file prior to visit. Allergies Allergen Reactions  Erythromycin Nausea and Vomiting  Pcn [Penicillins] Hives Past Medical History:  
Diagnosis Date  Depression  Sinusitis 2011  
 (right sphenoid) Past Surgical History:  
Procedure Laterality Date [facility] ORTHOPAEDIC  2008 ORIF (left ankle)  HX OTHER SURGICAL    
 L ankle surgery 2008 Family History Problem Relation Age of Onset  Hypertension Mother  Breast Cancer Mother (x2)  Hypertension Father  Diabetes Father  Cancer Brother Social History Socioeconomic History  Marital status:  Spouse name: Not on file  Number of children: Not on file  Years of education: Not on file  Highest education level: Not on file Occupational History  Not on file Social Needs  Financial resource strain: Not on file  Food insecurity:  
  Worry: Not on file Inability: Not on file  Transportation needs:  
  Medical: Not on file Non-medical: Not on file Tobacco Use  Smoking status: Never Smoker  Smokeless tobacco: Never Used Substance and Sexual Activity  Alcohol use: No  
  Alcohol/week: 0.5 - 1.0 oz Types: 1 - 2 Standard drinks or equivalent per week  Drug use: No  
 Sexual activity: Yes  
  Partners: Male Birth control/protection: None Lifestyle  Physical activity:  
  Days per week: Not on file Minutes per session: Not on file  Stress: Not on file Relationships  Social connections:  
  Talks on phone: Not on file Gets together: Not on file Attends Yazidism service: Not on file Active member of club or organization: Not on file Attends meetings of clubs or organizations: Not on file Relationship status: Not on file  Intimate partner violence:  
  Fear of current or ex partner: Not on file Emotionally abused: Not on file Physically abused: Not on file Forced sexual activity: Not on file Other Topics Concern  Not on file Social History Narrative  Not on file Admission on 02/09/2019, Discharged on 02/09/2019 Component Date Value Ref Range Status  WBC 02/09/2019 8.4  3.6 - 11.0 K/uL Final  
 RBC 02/09/2019 4.59  3.80 - 5.20 M/uL Final  
 HGB 02/09/2019 14.0  11.5 - 16.0 g/dL Final  
 HCT 02/09/2019 41.5  35.0 - 47.0 % Final  
 MCV 02/09/2019 90.4  80.0 - 99.0 FL Final  
 MCH 02/09/2019 30.5  26.0 - 34.0 PG Final  
 MCHC 02/09/2019 33.7  30.0 - 36.5 g/dL Final  
 RDW 02/09/2019 11.9  11.5 - 14.5 % Final  
 PLATELET 10/19/7682 039  150 - 400 K/uL Final  
 MPV 02/09/2019 9.3  8.9 - 12.9 FL Final  
 NRBC 02/09/2019 0.0  0  WBC Final  
 ABSOLUTE NRBC 02/09/2019 0.00  0.00 - 0.01 K/uL Final  
 NEUTROPHILS 02/09/2019 42  32 - 75 % Final  
 LYMPHOCYTES 02/09/2019 49  12 - 49 % Final  
 MONOCYTES 02/09/2019 7  5 - 13 % Final  
 EOSINOPHILS 02/09/2019 1  0 - 7 % Final  
 BASOPHILS 02/09/2019 0  0 - 1 % Final  
 IMMATURE GRANULOCYTES 02/09/2019 0  0.0 - 0.5 % Final  
 ABS. NEUTROPHILS 02/09/2019 3.6  1.8 - 8.0 K/UL Final  
 ABS. LYMPHOCYTES 02/09/2019 4.1* 0.8 - 3.5 K/UL Final  
 ABS. MONOCYTES 02/09/2019 0.6  0.0 - 1.0 K/UL Final  
 ABS. EOSINOPHILS 02/09/2019 0.1  0.0 - 0.4 K/UL Final  
 ABS. BASOPHILS 02/09/2019 0.0  0.0 - 0.1 K/UL Final  
 ABS. IMM.  GRANS. 02/09/2019 0.0  0.00 - 0.04 K/UL Final  
 DF 02/09/2019 AUTOMATED    Final  
 Sodium 02/09/2019 140  136 - 145 mmol/L Final  
 Potassium 02/09/2019 3.8  3.5 - 5.1 mmol/L Final  
 Chloride 02/09/2019 107  97 - 108 mmol/L Final  
 CO2 02/09/2019 25  21 - 32 mmol/L Final  
 Anion gap 02/09/2019 8  5 - 15 mmol/L Final  
 Glucose 02/09/2019 121* 65 - 100 mg/dL Final  
 BUN 02/09/2019 13  6 - 20 MG/DL Final  
 Creatinine 02/09/2019 0.82  0.55 - 1.02 MG/DL Final  
 BUN/Creatinine ratio 02/09/2019 16  12 - 20   Final  
 GFR est AA 02/09/2019 >60  >60 ml/min/1.73m2 Final  
 GFR est non-AA 02/09/2019 >60  >60 ml/min/1.73m2 Final  
 Calcium 02/09/2019 9.1  8.5 - 10.1 MG/DL Final  
 Bilirubin, total 02/09/2019 0.2  0.2 - 1.0 MG/DL Final  
  ALT (SGPT) 02/09/2019 18  12 - 78 U/L Final  
 AST (SGOT) 02/09/2019 23  15 - 37 U/L Final  
 Alk. phosphatase 02/09/2019 71  45 - 117 U/L Final  
 Protein, total 02/09/2019 8.1  6.4 - 8.2 g/dL Final  
 Albumin 02/09/2019 3.3* 3.5 - 5.0 g/dL Final  
 Globulin 02/09/2019 4.8* 2.0 - 4.0 g/dL Final  
 A-G Ratio 02/09/2019 0.7* 1.1 - 2.2   Final  
 
 
Review of Systems Constitutional: Negative for malaise/fatigue. HENT: Negative for congestion. Eyes: Negative for blurred vision and pain. Respiratory: Negative for cough and shortness of breath. Cardiovascular: Negative for chest pain and palpitations. Gastrointestinal: Negative for abdominal pain and heartburn. Genitourinary: Negative for frequency and urgency. Musculoskeletal: Negative for joint pain and myalgias. Neurological: Positive for headaches. Negative for dizziness, tingling, sensory change and weakness. Psychiatric/Behavioral: Negative for depression, memory loss and substance abuse. Visit Vitals /80 (BP 1 Location: Left arm, BP Patient Position: Sitting) Pulse 75 Temp 98.4 °F (36.9 °C) (Oral) Resp 18 Ht 5' 5\" (1.651 m) Wt 242 lb 9.6 oz (110 kg) LMP 12/28/2013 SpO2 98% BMI 40.37 kg/m² Physical Exam  
Constitutional: She is oriented to person, place, and time. She appears well-developed. No distress. Morbidly obese HENT:  
Right Ear: External ear normal.  
Left Ear: External ear normal.  
Eyes: Conjunctivae and EOM are normal. Right eye exhibits no discharge. Left eye exhibits no discharge. Neck: Normal range of motion. Neck supple. Cardiovascular: Normal rate and regular rhythm. Pulmonary/Chest: Effort normal and breath sounds normal. She has no wheezes. Abdominal: Soft. Bowel sounds are normal. There is no tenderness. Lymphadenopathy:  
  She has no cervical adenopathy. Neurological: She is alert and oriented to person, place, and time. Skin: She is not diaphoretic. Psychiatric: She has a normal mood and affect. Her behavior is normal.  
Nursing note and vitals reviewed. ASSESSMENT and PLAN 
  ICD-10-CM ICD-9-CM 1. Migraine without aura and without status migrainosus, not intractable G43.009 346.10 She takes Imitrex 50 mg as needed. Will consider Topamax in the future. 2. Obesity, morbid ([facility] [address] E66.01 278.01 Followed by Dr. Kayla Melvin (weightloss at [facility]). Compliant on Wellbutrin 150 mg and will start naltrexone. Patient was instructed to follow a keto diet. Will discuss Topamax/Trokendi with her in the future. I also recommend discussed phentermine at weight loss clinic. Advised pt to purchase a FitBit or similar device. Discussed that a healthy lifestyle requires 5,000-7,000 steps daily, but weight loss requires 10,000 steps daily. She should exercise at least 30-45 minutes daily. Pt can also try taking OTC chromium to help with cravings for carbohydrates. This plan was reviewed with the patient and patient agrees. All questions were answered. This scribe documentation was reviewed by me and accurately reflects the examination and decisions made by me. This note will not be viewable in [address]

## 2019-04-26 NOTE — PROGRESS NOTES
Visit Vitals BP (!) 138/92 (BP 1 Location: Left arm, BP Patient Position: Sitting) Pulse 75 Temp 98.4 °F (36.9 °C) (Oral) Resp 18 Ht 5' 5\" (1.651 m) Wt 242 lb 9.6 oz (110 kg) SpO2 98% BMI 40.37 kg/m² Chief Complaint Patient presents with [address] [place] [address] Patient needs a new PCP 1. Have you been to the ER, urgent care clinic since your last visit? Hospitalized since your last visit? Denies 2. Have you seen or consulted any other health care providers outside of the [address] [place] [address] since your last visit? Include any pap smears or colon screening. Denies

## 2021-07-14 RX ORDER — SUMATRIPTAN 50 MG/1
TABLET, FILM COATED ORAL
Qty: 9 TABLET | Refills: 5 | Status: SHIPPED | OUTPATIENT
Start: 2021-07-14 | End: 2021-10-19 | Stop reason: ALTCHOICE

## 2021-07-14 NOTE — TELEPHONE ENCOUNTER
----- Message from Thai Ness sent at 7/14/2021  1:07 PM EDT -----  Regarding: Dr. Simon Pack first and last name: Patient       Reason for call: would like to know if she can have a prescription sent to [address] [place] [address] for her Migraine Medication, pt has an appt on 10/19 and has not been seen since 2019      Callback required yes/no and why: yes       Best contact number(s):[phone]      Details to clarify the request:      Thai Ness

## 2021-10-19 ENCOUNTER — OFFICE VISIT (OUTPATIENT)
Dept: NEUROLOGY | Age: 36
End: 2021-10-19
Payer: COMMERCIAL

## 2021-10-19 ENCOUNTER — DOCUMENTATION ONLY (OUTPATIENT)
Dept: NEUROLOGY | Age: 36
End: 2021-10-19

## 2021-10-19 VITALS
WEIGHT: 242 LBS | HEART RATE: 74 BPM | OXYGEN SATURATION: 98 % | HEIGHT: 65 IN | BODY MASS INDEX: 40.32 KG/M2 | RESPIRATION RATE: 18 BRPM | SYSTOLIC BLOOD PRESSURE: 110 MMHG | DIASTOLIC BLOOD PRESSURE: 80 MMHG

## 2021-10-19 DIAGNOSIS — G43.709 CHRONIC MIGRAINE W/O AURA W/O STATUS MIGRAINOSUS, NOT INTRACTABLE: Primary | ICD-10-CM

## 2021-10-19 PROCEDURE — 99214 OFFICE O/P EST MOD 30 MIN: CPT | Performed by: PSYCHIATRY & NEUROLOGY

## 2021-10-19 RX ORDER — PROMETHAZINE HYDROCHLORIDE 12.5 MG/1
12.5 TABLET ORAL
Qty: 30 TABLET | Refills: 1 | Status: SHIPPED | OUTPATIENT
Start: 2021-10-19

## 2021-10-19 RX ORDER — FROVATRIPTAN SUCCINATE 2.5 MG/1
2.5 TABLET, FILM COATED ORAL
Qty: 8 TABLET | Refills: 1 | Status: SHIPPED | OUTPATIENT
Start: 2021-10-19 | End: 2021-11-09 | Stop reason: ALTCHOICE

## 2021-10-19 NOTE — PROGRESS NOTES
Gave 3 packs of Nurtec 75mg tablets to take home, per Dr. Cm Booker.  Fabi Jo WTY#[id number], exp 2022-10

## 2021-10-19 NOTE — PROGRESS NOTES
Chief Complaint   Patient presents with    Migraine         HISTORY OF PRESENT ILLNESS  Courtney Monge came back for follow-up. She was last seen over 2 years ago. She says that her migraine frequency is stable. She usually gets one episode per month but it is prolonged and lasts almost 6 to 7 days each time. She takes sumatriptan 50 mg but has to keep repeating the dose for several days as it does not seem to help. She cannot find any triggers in her headaches are not related to menstrual cycles. RECAp  She describes her headaches as mainly unilateral, intense pain, associated with light sensitivity, noise sensitivity, nausea and vomiting. She was seen in the emergency department and was advised to follow-up with neurology. She used to take NSAIDs which would work but they stopped working about a month ago. In the emergency department she was given Imitrex injection which worked. She is now taking sumatriptan 25 mg as needed which helps. She has never been on any prophylactics  Denies any other associated neurological sym ptoms. No blurring of vision or diplopia. She does not know of any triggers  She works as a research analyst at [employer]      Current Outpatient Medications   Medication Sig    frovatriptan (FROVA) 2.5 mg tablet Take 1 Tablet by mouth daily as needed for Migraine.  promethazine (PHENERGAN) 12.5 mg tablet Take 1 Tablet by mouth two (2) times daily as needed for Nausea.  buPROPion SR (WELLBUTRIN SR) 150 mg SR tablet Take  by mouth two (2) times a day. (Patient not taking: Reported on 10/19/2021)    naltrexone (DEPADE) 50 mg tablet Take 50 mg by mouth daily. (Patient not taking: Reported on 10/19/2021)    PNV66-Iron Fumarate-FA-DSS-DHA 26-1.2- mg cap Take  by mouth. Indications: Pregnancy (Patient not taking: Reported on 10/19/2021)     No current facility-administered medications for this visit.      PHYSICAL EXAMINATION:    Visit Vitals  /80   Pulse 74   Resp 18   Ht 5' 5\" (1.651 m)   Wt 242 lb (109.8 kg)   SpO2 98%   BMI 40.27 kg/m²       NEUROLOGICAL EXAMINATION:     Mental Status:   Alert and oriented to person, place, and time. Attention span and concentration are normal. Speech is fluent . Cranial Nerves:    II, III, IV, VI:  Visual acuity grossly intact. Visual fields are normal.    Pupils are equal, round, and reactive. Extra-ocular movements are full and fluid. V-XII: Hearing is grossly intact. Facial features are symmetric, with normal sensation and strength. The palate rises symmetrically and the tongue protrudes midline. Motor Examination: Normal tone, bulk, and strength. Sensory exam:  Normal throughout     Coordination:  Finger to nose and rapid arm movement testing was normal.   No resting or intention tremor    Gait and Station:  Steady. Normal arm swing. No muscle wasting or fasiculations noted. LABS / IMAGING  CT Results (most recent):  Results from Hospital Encounter encounter on 02/09/19    CT HEAD WO CONT    Narrative  EXAM: CT HEAD WO CONT    INDICATION: HA x one week    COMPARISON: 7/1/2011. CONTRAST: None. TECHNIQUE: Unenhanced CT of the head was performed using 5 mm images. Brain and  bone windows were generated. CT dose reduction was achieved through use of a  standardized protocol tailored for this examination and automatic exposure  control for dose modulation. FINDINGS:  The ventricles and sulci are normal in size, shape and configuration and  midline. There is no significant white matter disease. There is no intracranial  hemorrhage, extra-axial collection, mass, mass effect or midline shift. The  basilar cisterns are open. No acute infarct is identified. The bone windows  demonstrate no abnormalities. The visualized portions of the paranasal sinuses  and mastoid air cells are clear. Impression  IMPRESSION: No acute intracranial abnormality. ASSESSMENT    ICD-10-CM ICD-9-CM    1.  Chronic migraine w/o aura w/o status migrainosus, not intractable  G43.709 346.70 frovatriptan (FROVA) 2.5 mg tablet      promethazine (PHENERGAN) 12.5 mg tablet       DISCUSSION  Ms. Rhys Reed  has chronic migraines without aura   She has not been able to find any triggers  Her migraine episodes are not frequent but they tend to be very prolonged, lasting 6 to 7 days each month  Sumatriptan has not been able to break her headaches. We will try her on a longer acting triptan i.e. frovatriptan as needed.   She should combine it with an NSAID and Phenergan  A sample of Nurtec was given to try  Deferring any prophylactics at this time  Follow-up annually or earlier if needed    Elly Wood MD  Diplomate, American Board of Psychiatry & Neurology (Neurology)  Diplomate, American Board of Psychiatry & Neurology (Clinical Neurophysiology)  Diplomate, American Board of Electrodiagnostic Medicine

## 2021-10-19 NOTE — PROGRESS NOTES
Mr. Albert Bryson presents today to follow up migraines. She reported approximately one migraine per month. Depression screening done on this patient.

## 2021-10-28 ENCOUNTER — TELEPHONE (OUTPATIENT)
Dept: NEUROLOGY | Age: 36
End: 2021-10-28

## 2021-11-03 DIAGNOSIS — G43.709 CHRONIC MIGRAINE W/O AURA W/O STATUS MIGRAINOSUS, NOT INTRACTABLE: Primary | ICD-10-CM

## 2021-11-03 RX ORDER — ELETRIPTAN HYDROBROMIDE 40 MG/1
40 TABLET, FILM COATED ORAL
Qty: 10 TABLET | Refills: 1 | Status: SHIPPED | OUTPATIENT
Start: 2021-11-03 | End: 2021-11-03

## 2021-11-03 NOTE — TELEPHONE ENCOUNTER
Re: frovatriptan    rcvd denial letter, scanned ton chart. Per letter, Dave Weathers wants pt to try 2 other triptans 1st.    Almotriptan, naratriptan, eletriptan, rizatriptan, sumatriptan, zolmitriptan    Pt has tried Sumatriptan.  Sent update to

## 2021-11-03 NOTE — TELEPHONE ENCOUNTER
Per Dr. Harp Monday, Please inform patient of the insurance decision. Denisse Spring will send in prescription for eletriptan for her to try

## 2021-11-09 ENCOUNTER — TELEPHONE (OUTPATIENT)
Dept: NEUROLOGY | Age: 36
End: 2021-11-09

## 2021-11-09 RX ORDER — ELETRIPTAN HYDROBROMIDE 40 MG/1
40 TABLET, FILM COATED ORAL
Qty: 10 TABLET | Refills: 3 | Status: SHIPPED | OUTPATIENT
Start: 2021-11-09

## 2021-11-09 NOTE — TELEPHONE ENCOUNTER
Re: Eletriptan    [facility] [place] request through [address] [place] [address] [address] and awaiting update.     [employer]anya [place] approval, effective 11/09/21-11/09/22    Called pharmacy and left v/m

## 2022-03-20 PROBLEM — E66.01 OBESITY, MORBID (HCC): Status: ACTIVE | Noted: 2019-04-26

## 2023-05-16 RX ORDER — NALTREXONE HYDROCHLORIDE 50 MG/1
50 TABLET, FILM COATED ORAL DAILY
COMMUNITY

## 2023-05-16 RX ORDER — BUPROPION HYDROCHLORIDE 150 MG/1
TABLET, EXTENDED RELEASE ORAL 2 TIMES DAILY
COMMUNITY

## 2023-05-16 RX ORDER — PROMETHAZINE HYDROCHLORIDE 12.5 MG/1
TABLET ORAL 2 TIMES DAILY PRN
COMMUNITY
Start: 2021-10-19

## 2023-05-16 RX ORDER — ELETRIPTAN HYDROBROMIDE 40 MG/1
TABLET, FILM COATED ORAL DAILY PRN
COMMUNITY
Start: 2021-11-09

## 2025-03-29 NOTE — PATIENT INSTRUCTIONS
[facility]   Statement to Patients  April 1, 2014      In an effort to ensure the large volume of patient prescription refills is processed in the most efficient and expeditious manner, we are asking our patients to assist us by calling your Pharmacy for all prescription refills, this will include also your  Mail Order Pharmacy. The pharmacy will contact our office electronically to continue the refill process. Please do not wait until the last minute to call your pharmacy. We need at least 48 hours (2days) to fill prescriptions. We also encourage you to call your pharmacy before going to  your prescription to make sure it is ready. With regard to controlled substance prescription refill requests (narcotic refills) that need to be picked up at our office, we ask your cooperation by providing us with at least 72 hours (3days) notice that you will need a refill. We will not refill narcotic prescription refill requests after 4:00pm on any weekday, Monday through Thursday, or after 2:00pm on Fridays, or on the weekends. We encourage everyone to explore another way of getting your prescription refill request processed using [employer], our patient web portal through our electronic medical record system. [employer] is an efficient and effective way to communicate your medication request directly to the office and  downloadable as an miller on your smart phone . [employer] also features a review functionality that allows you to view your medication list as well as leave messages for your physician. Are you ready to get connected? If so please review the attatched instructions or speak to any of our staff to get you set up right away! Thank you so much for your cooperation. Should you have any questions please contact our Practice Administrator.     The Physicians and Staff,  [facility] (2) more than 100 beats/min